# Patient Record
Sex: MALE | Race: WHITE | NOT HISPANIC OR LATINO
[De-identification: names, ages, dates, MRNs, and addresses within clinical notes are randomized per-mention and may not be internally consistent; named-entity substitution may affect disease eponyms.]

---

## 2017-03-13 ENCOUNTER — APPOINTMENT (OUTPATIENT)
Dept: SPINE | Facility: CLINIC | Age: 72
End: 2017-03-13

## 2017-03-13 VITALS
HEART RATE: 81 BPM | BODY MASS INDEX: 29.55 KG/M2 | DIASTOLIC BLOOD PRESSURE: 80 MMHG | WEIGHT: 195 LBS | SYSTOLIC BLOOD PRESSURE: 132 MMHG | HEIGHT: 68 IN

## 2017-03-13 RX ORDER — DICLOFENAC SODIUM 10 MG/G
1 GEL TOPICAL
Refills: 0 | Status: ACTIVE | COMMUNITY

## 2017-03-17 ENCOUNTER — OUTPATIENT (OUTPATIENT)
Dept: OUTPATIENT SERVICES | Facility: HOSPITAL | Age: 72
LOS: 1 days | End: 2017-03-17
Payer: MEDICARE

## 2017-03-17 ENCOUNTER — APPOINTMENT (OUTPATIENT)
Dept: RADIOLOGY | Facility: CLINIC | Age: 72
End: 2017-03-17

## 2017-03-17 DIAGNOSIS — M48.06 SPINAL STENOSIS, LUMBAR REGION: ICD-10-CM

## 2017-03-17 PROCEDURE — 72110 X-RAY EXAM L-2 SPINE 4/>VWS: CPT

## 2017-03-17 PROCEDURE — 72082 X-RAY EXAM ENTIRE SPI 2/3 VW: CPT

## 2017-03-23 ENCOUNTER — APPOINTMENT (OUTPATIENT)
Dept: SPINE | Facility: CLINIC | Age: 72
End: 2017-03-23

## 2017-03-23 VITALS
WEIGHT: 195 LBS | BODY MASS INDEX: 29.55 KG/M2 | SYSTOLIC BLOOD PRESSURE: 130 MMHG | HEIGHT: 68 IN | DIASTOLIC BLOOD PRESSURE: 84 MMHG

## 2017-03-27 ENCOUNTER — APPOINTMENT (OUTPATIENT)
Dept: SPINE | Facility: CLINIC | Age: 72
End: 2017-03-27

## 2017-03-30 ENCOUNTER — OUTPATIENT (OUTPATIENT)
Dept: OUTPATIENT SERVICES | Facility: HOSPITAL | Age: 72
LOS: 1 days | End: 2017-03-30
Payer: MEDICARE

## 2017-03-30 ENCOUNTER — APPOINTMENT (OUTPATIENT)
Dept: CT IMAGING | Facility: IMAGING CENTER | Age: 72
End: 2017-03-30

## 2017-03-30 DIAGNOSIS — M48.06 SPINAL STENOSIS, LUMBAR REGION: ICD-10-CM

## 2017-03-30 PROCEDURE — 72131 CT LUMBAR SPINE W/O DYE: CPT

## 2017-04-13 ENCOUNTER — APPOINTMENT (OUTPATIENT)
Dept: SPINE | Facility: CLINIC | Age: 72
End: 2017-04-13

## 2017-04-13 VITALS
HEIGHT: 68 IN | DIASTOLIC BLOOD PRESSURE: 78 MMHG | BODY MASS INDEX: 29.55 KG/M2 | WEIGHT: 195 LBS | SYSTOLIC BLOOD PRESSURE: 124 MMHG

## 2017-04-13 DIAGNOSIS — F40.240 CLAUSTROPHOBIA: ICD-10-CM

## 2017-04-17 ENCOUNTER — APPOINTMENT (OUTPATIENT)
Dept: MRI IMAGING | Facility: IMAGING CENTER | Age: 72
End: 2017-04-17

## 2017-04-17 ENCOUNTER — OUTPATIENT (OUTPATIENT)
Dept: OUTPATIENT SERVICES | Facility: HOSPITAL | Age: 72
LOS: 1 days | End: 2017-04-17
Payer: MEDICARE

## 2017-04-17 DIAGNOSIS — G95.9 DISEASE OF SPINAL CORD, UNSPECIFIED: ICD-10-CM

## 2017-04-17 DIAGNOSIS — M50.90 CERVICAL DISC DISORDER, UNSPECIFIED, UNSPECIFIED CERVICAL REGION: ICD-10-CM

## 2017-04-17 PROCEDURE — 72141 MRI NECK SPINE W/O DYE: CPT

## 2017-04-20 ENCOUNTER — APPOINTMENT (OUTPATIENT)
Dept: SPINE | Facility: CLINIC | Age: 72
End: 2017-04-20

## 2017-04-20 VITALS
HEIGHT: 68 IN | SYSTOLIC BLOOD PRESSURE: 124 MMHG | WEIGHT: 195 LBS | BODY MASS INDEX: 29.55 KG/M2 | DIASTOLIC BLOOD PRESSURE: 82 MMHG

## 2017-04-20 DIAGNOSIS — M50.90 CERVICAL DISC DISORDER, UNSPECIFIED, UNSPECIFIED CERVICAL REGION: ICD-10-CM

## 2017-04-20 DIAGNOSIS — G95.9 DISEASE OF SPINAL CORD, UNSPECIFIED: ICD-10-CM

## 2017-06-15 ENCOUNTER — APPOINTMENT (OUTPATIENT)
Dept: SPINE | Facility: CLINIC | Age: 72
End: 2017-06-15

## 2017-06-15 VITALS
SYSTOLIC BLOOD PRESSURE: 135 MMHG | DIASTOLIC BLOOD PRESSURE: 85 MMHG | BODY MASS INDEX: 29.55 KG/M2 | HEIGHT: 68 IN | WEIGHT: 195 LBS

## 2017-06-16 ENCOUNTER — APPOINTMENT (OUTPATIENT)
Dept: MRI IMAGING | Facility: CLINIC | Age: 72
End: 2017-06-16

## 2017-06-16 ENCOUNTER — OUTPATIENT (OUTPATIENT)
Dept: OUTPATIENT SERVICES | Facility: HOSPITAL | Age: 72
LOS: 1 days | End: 2017-06-16
Payer: MEDICARE

## 2017-06-16 DIAGNOSIS — M48.06 SPINAL STENOSIS, LUMBAR REGION: ICD-10-CM

## 2017-06-16 PROCEDURE — 72146 MRI CHEST SPINE W/O DYE: CPT

## 2017-07-10 ENCOUNTER — OUTPATIENT (OUTPATIENT)
Dept: OUTPATIENT SERVICES | Facility: HOSPITAL | Age: 72
LOS: 1 days | End: 2017-07-10
Payer: MEDICARE

## 2017-07-10 VITALS
HEIGHT: 68 IN | WEIGHT: 197.98 LBS | TEMPERATURE: 98 F | SYSTOLIC BLOOD PRESSURE: 113 MMHG | RESPIRATION RATE: 18 BRPM | DIASTOLIC BLOOD PRESSURE: 79 MMHG | HEART RATE: 77 BPM | OXYGEN SATURATION: 97 %

## 2017-07-10 DIAGNOSIS — M54.9 DORSALGIA, UNSPECIFIED: ICD-10-CM

## 2017-07-10 DIAGNOSIS — Z98.890 OTHER SPECIFIED POSTPROCEDURAL STATES: Chronic | ICD-10-CM

## 2017-07-10 DIAGNOSIS — M48.06 SPINAL STENOSIS, LUMBAR REGION: ICD-10-CM

## 2017-07-10 DIAGNOSIS — Z01.818 ENCOUNTER FOR OTHER PREPROCEDURAL EXAMINATION: ICD-10-CM

## 2017-07-10 DIAGNOSIS — I10 ESSENTIAL (PRIMARY) HYPERTENSION: ICD-10-CM

## 2017-07-10 LAB
ANION GAP SERPL CALC-SCNC: 16 MMOL/L — SIGNIFICANT CHANGE UP (ref 5–17)
BUN SERPL-MCNC: 24 MG/DL — HIGH (ref 7–23)
CALCIUM SERPL-MCNC: 9.8 MG/DL — SIGNIFICANT CHANGE UP (ref 8.4–10.5)
CHLORIDE SERPL-SCNC: 103 MMOL/L — SIGNIFICANT CHANGE UP (ref 96–108)
CO2 SERPL-SCNC: 24 MMOL/L — SIGNIFICANT CHANGE UP (ref 22–31)
CREAT SERPL-MCNC: 1.43 MG/DL — HIGH (ref 0.5–1.3)
GLUCOSE SERPL-MCNC: 114 MG/DL — HIGH (ref 70–99)
HCT VFR BLD CALC: 45.3 % — SIGNIFICANT CHANGE UP (ref 39–50)
HGB BLD-MCNC: 15.8 G/DL — SIGNIFICANT CHANGE UP (ref 13–17)
MCHC RBC-ENTMCNC: 30.4 PG — SIGNIFICANT CHANGE UP (ref 27–34)
MCHC RBC-ENTMCNC: 34.9 GM/DL — SIGNIFICANT CHANGE UP (ref 32–36)
MCV RBC AUTO: 87.3 FL — SIGNIFICANT CHANGE UP (ref 80–100)
PLATELET # BLD AUTO: 208 K/UL — SIGNIFICANT CHANGE UP (ref 150–400)
POTASSIUM SERPL-MCNC: 3.7 MMOL/L — SIGNIFICANT CHANGE UP (ref 3.5–5.3)
POTASSIUM SERPL-SCNC: 3.7 MMOL/L — SIGNIFICANT CHANGE UP (ref 3.5–5.3)
RBC # BLD: 5.19 M/UL — SIGNIFICANT CHANGE UP (ref 4.2–5.8)
RBC # FLD: 13.1 % — SIGNIFICANT CHANGE UP (ref 10.3–14.5)
SODIUM SERPL-SCNC: 143 MMOL/L — SIGNIFICANT CHANGE UP (ref 135–145)
WBC # BLD: 6.01 K/UL — SIGNIFICANT CHANGE UP (ref 3.8–10.5)
WBC # FLD AUTO: 6.01 K/UL — SIGNIFICANT CHANGE UP (ref 3.8–10.5)

## 2017-07-10 PROCEDURE — 85027 COMPLETE CBC AUTOMATED: CPT

## 2017-07-10 PROCEDURE — G0463: CPT

## 2017-07-10 PROCEDURE — 80048 BASIC METABOLIC PNL TOTAL CA: CPT

## 2017-07-10 RX ORDER — LIDOCAINE HCL 20 MG/ML
0.2 VIAL (ML) INJECTION ONCE
Qty: 0 | Refills: 0 | Status: DISCONTINUED | OUTPATIENT
Start: 2017-07-21 | End: 2017-07-21

## 2017-07-10 RX ORDER — CEFAZOLIN SODIUM 1 G
2000 VIAL (EA) INJECTION ONCE
Qty: 0 | Refills: 0 | Status: DISCONTINUED | OUTPATIENT
Start: 2017-07-21 | End: 2017-07-21

## 2017-07-10 RX ORDER — SODIUM CHLORIDE 9 MG/ML
3 INJECTION INTRAMUSCULAR; INTRAVENOUS; SUBCUTANEOUS EVERY 8 HOURS
Qty: 0 | Refills: 0 | Status: DISCONTINUED | OUTPATIENT
Start: 2017-07-21 | End: 2017-07-21

## 2017-07-10 NOTE — H&P PST ADULT - PSH
History of AAA (Abdominal Aortic Aneurysm) Repair  2007  Left Cataract    S/P Partial Lobectomy of Lung  scar tissue  Torn Achilles Tendon  right History of AAA (Abdominal Aortic Aneurysm) Repair  2007  History of hydrocelectomy    Left Cataract    S/P Partial Lobectomy of Lung  scar tissue  Torn Achilles Tendon  right

## 2017-07-10 NOTE — H&P PST ADULT - HISTORY OF PRESENT ILLNESS
72 year old male presents for placement of permanent spinal cord stimulator. Pt. reports experiencing chronic lumbar sacral back pain X 20+ years. s/p spinal cord stimulator trial with relief from pain.

## 2017-07-10 NOTE — H&P PST ADULT - PMH
Back Pain    Chronic back pain    Folliculitis    History of DVT (deep vein thrombosis)  s/p AAA repair  HTN (Hypertension)    Hyperlipidemia    Lumbar disc disease    Spinal stenosis

## 2017-07-21 ENCOUNTER — TRANSCRIPTION ENCOUNTER (OUTPATIENT)
Age: 72
End: 2017-07-21

## 2017-07-21 ENCOUNTER — APPOINTMENT (OUTPATIENT)
Dept: SPINE | Facility: HOSPITAL | Age: 72
End: 2017-07-21

## 2017-07-21 ENCOUNTER — OUTPATIENT (OUTPATIENT)
Dept: INPATIENT UNIT | Facility: HOSPITAL | Age: 72
LOS: 1 days | Discharge: HOME | End: 2017-07-21
Payer: MEDICARE

## 2017-07-21 VITALS
HEART RATE: 78 BPM | DIASTOLIC BLOOD PRESSURE: 69 MMHG | HEIGHT: 68 IN | WEIGHT: 197.98 LBS | TEMPERATURE: 98 F | OXYGEN SATURATION: 96 % | RESPIRATION RATE: 18 BRPM | SYSTOLIC BLOOD PRESSURE: 104 MMHG

## 2017-07-21 VITALS
HEART RATE: 70 BPM | OXYGEN SATURATION: 99 % | DIASTOLIC BLOOD PRESSURE: 67 MMHG | SYSTOLIC BLOOD PRESSURE: 123 MMHG | RESPIRATION RATE: 20 BRPM | TEMPERATURE: 98 F

## 2017-07-21 DIAGNOSIS — M48.06 SPINAL STENOSIS, LUMBAR REGION: ICD-10-CM

## 2017-07-21 DIAGNOSIS — Z98.890 OTHER SPECIFIED POSTPROCEDURAL STATES: Chronic | ICD-10-CM

## 2017-07-21 PROCEDURE — C1778: CPT

## 2017-07-21 PROCEDURE — 63655 IMPLANT NEUROELECTRODES: CPT

## 2017-07-21 PROCEDURE — C1787: CPT

## 2017-07-21 PROCEDURE — 63685 INS/RPLC SPI NPG/RCVR POCKET: CPT

## 2017-07-21 PROCEDURE — 76000 FLUOROSCOPY <1 HR PHYS/QHP: CPT

## 2017-07-21 PROCEDURE — C1713: CPT

## 2017-07-21 PROCEDURE — C1820: CPT

## 2017-07-21 PROCEDURE — C1889: CPT

## 2017-07-21 RX ORDER — OXYCODONE HYDROCHLORIDE 5 MG/1
5 TABLET ORAL ONCE
Qty: 0 | Refills: 0 | Status: DISCONTINUED | OUTPATIENT
Start: 2017-07-21 | End: 2017-07-21

## 2017-07-21 RX ORDER — CLINDAMYCIN PHOSPHATE GEL USP, 1% 10 MG/G
0 GEL TOPICAL
Qty: 0 | Refills: 0 | COMMUNITY

## 2017-07-21 RX ORDER — OXYCODONE AND ACETAMINOPHEN 5; 325 MG/1; MG/1
1 TABLET ORAL EVERY 4 HOURS
Qty: 0 | Refills: 0 | Status: DISCONTINUED | OUTPATIENT
Start: 2017-07-21 | End: 2017-07-21

## 2017-07-21 RX ORDER — ONDANSETRON 8 MG/1
4 TABLET, FILM COATED ORAL ONCE
Qty: 0 | Refills: 0 | Status: DISCONTINUED | OUTPATIENT
Start: 2017-07-21 | End: 2017-07-21

## 2017-07-21 RX ORDER — DEXTROSE MONOHYDRATE, SODIUM CHLORIDE, AND POTASSIUM CHLORIDE 50; .745; 4.5 G/1000ML; G/1000ML; G/1000ML
1000 INJECTION, SOLUTION INTRAVENOUS
Qty: 0 | Refills: 0 | Status: DISCONTINUED | OUTPATIENT
Start: 2017-07-21 | End: 2017-07-21

## 2017-07-21 RX ORDER — ASPIRIN/CALCIUM CARB/MAGNESIUM 324 MG
1 TABLET ORAL
Qty: 0 | Refills: 0 | COMMUNITY

## 2017-07-21 RX ORDER — FENTANYL CITRATE 50 UG/ML
50 INJECTION INTRAVENOUS
Qty: 0 | Refills: 0 | Status: DISCONTINUED | OUTPATIENT
Start: 2017-07-21 | End: 2017-07-21

## 2017-07-21 RX ORDER — CEPHALEXIN 500 MG
1 CAPSULE ORAL
Qty: 15 | Refills: 0
Start: 2017-07-21 | End: 2017-07-26

## 2017-07-21 RX ORDER — SODIUM CHLORIDE 9 MG/ML
1000 INJECTION, SOLUTION INTRAVENOUS
Qty: 0 | Refills: 0 | Status: DISCONTINUED | OUTPATIENT
Start: 2017-07-21 | End: 2017-07-21

## 2017-07-21 RX ADMIN — SODIUM CHLORIDE 3 MILLILITER(S): 9 INJECTION INTRAMUSCULAR; INTRAVENOUS; SUBCUTANEOUS at 09:30

## 2017-07-21 RX ADMIN — OXYCODONE HYDROCHLORIDE 5 MILLIGRAM(S): 5 TABLET ORAL at 15:32

## 2017-07-21 NOTE — ASU DISCHARGE PLAN (ADULT/PEDIATRIC). - FOLLOWUP APPOINTMENT CLINIC/PHYSICIAN
call Dr. Silva' office for follow up appointment. call Dr. Silva' office for follow up appointment. [phone] or [phone]

## 2017-07-21 NOTE — ASU DISCHARGE PLAN (ADULT/PEDIATRIC). - MEDICATION SUMMARY - MEDICATIONS TO TAKE
I will START or STAY ON the medications listed below when I get home from the hospital:    oxycodone-acetaminophen 5mg-325mg oral tablet  -- 1-2 tab(s) by mouth every 4- 6 hours, As Needed -for severe pain MDD:10 tabs  -- Indication: For pain    simvastatin 20 mg oral tablet  -- 1 tab(s) by mouth once a day (at bedtime)  -- Indication: For Homemed    felodipine 10 mg oral tablet, extended release  -- 2 tab(s) by mouth once a day  -- Indication: For Homemed    cephalexin 500 mg oral capsule  -- 1 cap(s) by mouth 3 times a day for 5 days. MDD:3 caps  -- Finish all this medication unless otherwise directed by prescriber.    -- Indication: For antibiotics    tiZANidine 6 mg oral capsule  -- 1 cap(s) by mouth once a day, As Needed  -- Indication: For Homemed    Coenzyme Q10 10 mg oral capsule  -- 1 tab(s) by mouth once a day  -- Indication: For Homemed    Vitamin D3  -- 1 tab(s) by mouth once a day  -- Indication: For Homemed

## 2017-07-21 NOTE — PATIENT PROFILE ADULT. - PSH
History of AAA (Abdominal Aortic Aneurysm) Repair  2007  History of hydrocelectomy    Left Cataract    S/P Partial Lobectomy of Lung  scar tissue  Torn Achilles Tendon  right

## 2017-07-21 NOTE — ASU DISCHARGE PLAN (ADULT/PEDIATRIC). - NOTIFY
Swelling that continues/Numbness, tingling/Bleeding that does not stop/Pain not relieved by Medications/Numbness, color, or temperature change to extremity/Unable to Urinate

## 2017-07-21 NOTE — ASU DISCHARGE PLAN (ADULT/PEDIATRIC). - MEDICATION SUMMARY - MEDICATIONS TO STOP TAKING
I will STOP taking the medications listed below when I get home from the hospital:    aspirin 81 mg oral delayed release tablet  -- 1 tab(s) by mouth once a day, last dose 7/6/17 as per Dr. Ocampo instruction

## 2017-07-23 ENCOUNTER — EMERGENCY (EMERGENCY)
Facility: HOSPITAL | Age: 72
LOS: 1 days | Discharge: HOME | End: 2017-07-23
Attending: EMERGENCY MEDICINE | Admitting: EMERGENCY MEDICINE
Payer: MEDICARE

## 2017-07-23 VITALS
RESPIRATION RATE: 19 BRPM | DIASTOLIC BLOOD PRESSURE: 84 MMHG | OXYGEN SATURATION: 96 % | SYSTOLIC BLOOD PRESSURE: 138 MMHG | TEMPERATURE: 98 F | HEIGHT: 68 IN | HEART RATE: 97 BPM

## 2017-07-23 VITALS
SYSTOLIC BLOOD PRESSURE: 133 MMHG | RESPIRATION RATE: 18 BRPM | OXYGEN SATURATION: 97 % | TEMPERATURE: 98 F | HEART RATE: 82 BPM | DIASTOLIC BLOOD PRESSURE: 84 MMHG

## 2017-07-23 DIAGNOSIS — M54.9 DORSALGIA, UNSPECIFIED: ICD-10-CM

## 2017-07-23 DIAGNOSIS — Z98.890 OTHER SPECIFIED POSTPROCEDURAL STATES: Chronic | ICD-10-CM

## 2017-07-23 LAB
ALBUMIN SERPL ELPH-MCNC: 4 G/DL — SIGNIFICANT CHANGE UP (ref 3.3–5)
ALP SERPL-CCNC: 59 U/L — SIGNIFICANT CHANGE UP (ref 40–120)
ALT FLD-CCNC: 16 U/L RC — SIGNIFICANT CHANGE UP (ref 10–45)
ANION GAP SERPL CALC-SCNC: 18 MMOL/L — HIGH (ref 5–17)
AST SERPL-CCNC: 21 U/L — SIGNIFICANT CHANGE UP (ref 10–40)
BASE EXCESS BLDV CALC-SCNC: 1.9 MMOL/L — SIGNIFICANT CHANGE UP (ref -2–2)
BASOPHILS # BLD AUTO: 0 K/UL — SIGNIFICANT CHANGE UP (ref 0–0.2)
BASOPHILS NFR BLD AUTO: 0.3 % — SIGNIFICANT CHANGE UP (ref 0–2)
BILIRUB SERPL-MCNC: 0.6 MG/DL — SIGNIFICANT CHANGE UP (ref 0.2–1.2)
BUN SERPL-MCNC: 26 MG/DL — HIGH (ref 7–23)
CA-I SERPL-SCNC: 1.17 MMOL/L — SIGNIFICANT CHANGE UP (ref 1.12–1.3)
CALCIUM SERPL-MCNC: 8.8 MG/DL — SIGNIFICANT CHANGE UP (ref 8.4–10.5)
CHLORIDE BLDV-SCNC: 103 MMOL/L — SIGNIFICANT CHANGE UP (ref 96–108)
CHLORIDE SERPL-SCNC: 102 MMOL/L — SIGNIFICANT CHANGE UP (ref 96–108)
CO2 BLDV-SCNC: 28 MMOL/L — SIGNIFICANT CHANGE UP (ref 22–30)
CO2 SERPL-SCNC: 22 MMOL/L — SIGNIFICANT CHANGE UP (ref 22–31)
CREAT SERPL-MCNC: 1.54 MG/DL — HIGH (ref 0.5–1.3)
CRP SERPL-MCNC: 11.3 MG/DL — HIGH (ref 0–0.4)
EOSINOPHIL # BLD AUTO: 0.2 K/UL — SIGNIFICANT CHANGE UP (ref 0–0.5)
EOSINOPHIL NFR BLD AUTO: 2.2 % — SIGNIFICANT CHANGE UP (ref 0–6)
ERYTHROCYTE [SEDIMENTATION RATE] IN BLOOD: 69 MM/HR — HIGH (ref 0–20)
GAS PNL BLDV: 138 MMOL/L — SIGNIFICANT CHANGE UP (ref 136–145)
GAS PNL BLDV: SIGNIFICANT CHANGE UP
GLUCOSE BLDV-MCNC: 133 MG/DL — HIGH (ref 70–99)
GLUCOSE SERPL-MCNC: 132 MG/DL — HIGH (ref 70–99)
HCO3 BLDV-SCNC: 26 MMOL/L — SIGNIFICANT CHANGE UP (ref 21–29)
HCT VFR BLD CALC: 43.8 % — SIGNIFICANT CHANGE UP (ref 39–50)
HCT VFR BLDA CALC: 45 % — SIGNIFICANT CHANGE UP (ref 39–50)
HGB BLD CALC-MCNC: 14.8 G/DL — SIGNIFICANT CHANGE UP (ref 13–17)
HGB BLD-MCNC: 15.2 G/DL — SIGNIFICANT CHANGE UP (ref 13–17)
LACTATE BLDV-MCNC: 1.6 MMOL/L — SIGNIFICANT CHANGE UP (ref 0.7–2)
LYMPHOCYTES # BLD AUTO: 1.2 K/UL — SIGNIFICANT CHANGE UP (ref 1–3.3)
LYMPHOCYTES # BLD AUTO: 15.2 % — SIGNIFICANT CHANGE UP (ref 13–44)
MCHC RBC-ENTMCNC: 31.6 PG — SIGNIFICANT CHANGE UP (ref 27–34)
MCHC RBC-ENTMCNC: 34.6 GM/DL — SIGNIFICANT CHANGE UP (ref 32–36)
MCV RBC AUTO: 91.3 FL — SIGNIFICANT CHANGE UP (ref 80–100)
MONOCYTES # BLD AUTO: 0.5 K/UL — SIGNIFICANT CHANGE UP (ref 0–0.9)
MONOCYTES NFR BLD AUTO: 6 % — SIGNIFICANT CHANGE UP (ref 2–14)
NEUTROPHILS # BLD AUTO: 6.2 K/UL — SIGNIFICANT CHANGE UP (ref 1.8–7.4)
NEUTROPHILS NFR BLD AUTO: 76.3 % — SIGNIFICANT CHANGE UP (ref 43–77)
OTHER CELLS CSF MANUAL: 17 ML/DL — LOW (ref 18–22)
PCO2 BLDV: 43 MMHG — SIGNIFICANT CHANGE UP (ref 35–50)
PH BLDV: 7.4 — SIGNIFICANT CHANGE UP (ref 7.35–7.45)
PLATELET # BLD AUTO: 193 K/UL — SIGNIFICANT CHANGE UP (ref 150–400)
PO2 BLDV: 51 MMHG — HIGH (ref 25–45)
POTASSIUM BLDV-SCNC: 3.4 MMOL/L — LOW (ref 3.5–5)
POTASSIUM SERPL-MCNC: 3.8 MMOL/L — SIGNIFICANT CHANGE UP (ref 3.5–5.3)
POTASSIUM SERPL-SCNC: 3.8 MMOL/L — SIGNIFICANT CHANGE UP (ref 3.5–5.3)
PROCALCITONIN SERPL-MCNC: <0.05 NG/ML — HIGH (ref 0–0.04)
PROT SERPL-MCNC: 7.6 G/DL — SIGNIFICANT CHANGE UP (ref 6–8.3)
RBC # BLD: 4.8 M/UL — SIGNIFICANT CHANGE UP (ref 4.2–5.8)
RBC # FLD: 12 % — SIGNIFICANT CHANGE UP (ref 10.3–14.5)
SAO2 % BLDV: 85 % — SIGNIFICANT CHANGE UP (ref 67–88)
SODIUM SERPL-SCNC: 142 MMOL/L — SIGNIFICANT CHANGE UP (ref 135–145)
WBC # BLD: 8.1 K/UL — SIGNIFICANT CHANGE UP (ref 3.8–10.5)
WBC # FLD AUTO: 8.1 K/UL — SIGNIFICANT CHANGE UP (ref 3.8–10.5)

## 2017-07-23 PROCEDURE — 82435 ASSAY OF BLOOD CHLORIDE: CPT

## 2017-07-23 PROCEDURE — 85027 COMPLETE CBC AUTOMATED: CPT

## 2017-07-23 PROCEDURE — 99284 EMERGENCY DEPT VISIT MOD MDM: CPT | Mod: 25

## 2017-07-23 PROCEDURE — 84145 PROCALCITONIN (PCT): CPT

## 2017-07-23 PROCEDURE — 85014 HEMATOCRIT: CPT

## 2017-07-23 PROCEDURE — 84132 ASSAY OF SERUM POTASSIUM: CPT

## 2017-07-23 PROCEDURE — 99283 EMERGENCY DEPT VISIT LOW MDM: CPT | Mod: GC

## 2017-07-23 PROCEDURE — 82947 ASSAY GLUCOSE BLOOD QUANT: CPT

## 2017-07-23 PROCEDURE — 80053 COMPREHEN METABOLIC PANEL: CPT

## 2017-07-23 PROCEDURE — 82330 ASSAY OF CALCIUM: CPT

## 2017-07-23 PROCEDURE — 85652 RBC SED RATE AUTOMATED: CPT

## 2017-07-23 PROCEDURE — 86140 C-REACTIVE PROTEIN: CPT

## 2017-07-23 PROCEDURE — 83605 ASSAY OF LACTIC ACID: CPT

## 2017-07-23 PROCEDURE — 82803 BLOOD GASES ANY COMBINATION: CPT

## 2017-07-23 PROCEDURE — 84295 ASSAY OF SERUM SODIUM: CPT

## 2017-07-23 NOTE — CONSULT NOTE ADULT - SUBJECTIVE AND OBJECTIVE BOX
HPI: Patient is a 63yo M w/ hx of chronic back pain, POD #2 s/p placement of spinal cord stimulator and battery, p/w redness around battery incision site w/ tenderness and warmth. Patient denies any fevers, chills, night sweats, nausea/vomiting, and new neurologic deficits, and has not had any drainage from the wound. WBC in ED is WNL at 8.1 and incision looks well healed w/ mild erythema and induration. Patient is neurologically intact on exam.      PAST MEDICAL HISTORY   History of DVT (deep vein thrombosis)  Folliculitis  Chronic back pain  Spinal stenosis  Lumbar disc disease  Hyperlipidemia  Back Pain  HTN (Hypertension)    PAST SURGICAL HISTORY   History of hydrocelectomy  S/P Partial Lobectomy of Lung  Torn Achilles Tendon  History of AAA (Abdominal Aortic Aneurysm) Repair  Left Cataract          SOCIAL HISTORY:   Occupation:   Marital Status:     FAMILY HISTORY:  No pertinent family history in first degree relatives      PHYSICAL EXAM:    Constitutional: No Acute Distress     Neurological: AOx3, Following Commands, Moving all Extremities     Motor exam:          Upper extremity                         Delt     Bicep     Tricep    HG                                                 R         5/5        5/5        5/5       5/5                                               L          5/5        5/5        5/5       5/5          Lower extremity                        HF         KF        KE       DF         PF                                                  R        5/5        5/5        5/5       5/5         5/5                                               L         5/5        5/5       5/5       5/5          5/5                                                 Sensation: x intact to light touch  [] decreased:     Pulmonary: Clear to Auscultation, No rales, No rhonchi, No wheezes     Cardiovascular: S1, S2, Regular rate and rhythm     Gastrointestinal: Soft, Non-tender, Non-distended     Extremities: No calf tenderness     Incision: well healing, c/d/i; mild area of erythema w/ induration surrounding incision; tender to palpation; no drainage or dehiscece    LABS:                        15.2   8.1   )-----------( 193      ( 23 Jul 2017 17:17 )             43.8

## 2017-07-23 NOTE — ED PROVIDER NOTE - SKIN, MLM
Skin normal color for race, warm, dry and intact. No evidence of rash. Around Surgical site right para lumbar area erythema and induration without fluctuation no d/c from surgical site that is intact no bleeding  --Flores

## 2017-07-23 NOTE — CONSULT NOTE ADULT - PROBLEM SELECTOR RECOMMENDATION 9
no acute neurosurgical intervention  pain control  continue home ruthfflex  patient can f/u w/ Dr. Silva this week in the office

## 2017-07-23 NOTE — ED PROVIDER NOTE - ATTENDING CONTRIBUTION TO CARE
I have seen and evaluated this patient with the resident.   I agree with the findings  unless other wise stated.  I have made appropriate changes in documentations where needed, After my face to face bedside evaluation, I am further  noting: Pt had recent surgery in back for nerve stimulator implantation has surrounding erythema and induration no d/c no fever no leucocytosis no signs of abscess on oral keflex evaluated by NSX resident who d/w dr Silva D/C home f/u with Dr Silva in 2 days  - Flores

## 2017-07-23 NOTE — ED ADULT NURSE NOTE - OBJECTIVE STATEMENT
72 year old male, past medical history of chronic back pain stimulator placed 3 days ago by Dr. Silva presents with "infection to stimulator site." Patient states that he was discharged from hospital on Friday (two days ago), today noticed erythema surrounding site as well as pain when laying on back. Patient went to Urgent Care and was referred to ED. No chest pain, SOB, n/v/d, or fever. Surgical site located right lower back, erythema surrounding site. Dried bloody discharge. Patient able to ambulate.

## 2017-07-23 NOTE — ED PROVIDER NOTE - MEDICAL DECISION MAKING DETAILS
Pt had recent surgery in back for nerve stimulator implantation has surrounding erythema and induration no d/c no fever no leucocytosis no signs of abscess on oral keflex evaluated by NSX resident who d/w dr Silva D/C home f/u with Dr Silva in 2 days  - Flores

## 2017-07-23 NOTE — CONSULT NOTE ADULT - ASSESSMENT
This is a 63 yo M POD#2 s/p placement of SCS and battery who p/w redness and induration at the battery incision site. Considering he is only two days out from surgery, and his WBC is normal, an operative site infection is highly unlikely; the symptoms are consistent w/ an inflammatory reaction that can be followed as an outpatient.

## 2017-07-23 NOTE — ED PROVIDER NOTE - OBJECTIVE STATEMENT
72 year old male, past medical history of chronic back pain stimulator placed 3 days ago by Dr. Silva presents for back pain. Patient reports wound site appears infected as it appears red and is very tender. No fevers, chills, nauseas, vomiting. No chest pain, shortness of breath, abdominal pain, diarrhea, constipation, cough, dysuria.

## 2017-07-24 ENCOUNTER — APPOINTMENT (OUTPATIENT)
Dept: SPINE | Facility: CLINIC | Age: 72
End: 2017-07-24

## 2017-07-24 VITALS
HEIGHT: 68 IN | WEIGHT: 200 LBS | DIASTOLIC BLOOD PRESSURE: 83 MMHG | HEART RATE: 80 BPM | BODY MASS INDEX: 30.31 KG/M2 | SYSTOLIC BLOOD PRESSURE: 144 MMHG

## 2017-07-24 RX ORDER — DIAZEPAM 5 MG/1
5 TABLET ORAL
Qty: 2 | Refills: 0 | Status: COMPLETED | COMMUNITY
Start: 2017-04-13 | End: 2017-07-24

## 2017-07-24 RX ORDER — DIAZEPAM 5 MG/1
5 TABLET ORAL ONCE
Qty: 2 | Refills: 0 | Status: COMPLETED | COMMUNITY
Start: 2017-06-15 | End: 2017-07-24

## 2017-07-24 RX ORDER — TIZANIDINE HYDROCHLORIDE 6 MG/1
6 CAPSULE ORAL
Qty: 30 | Refills: 0 | Status: COMPLETED | COMMUNITY
Start: 2017-03-24 | End: 2017-07-24

## 2017-07-24 RX ORDER — SIMVASTATIN 20 MG/1
20 TABLET, FILM COATED ORAL
Refills: 0 | Status: COMPLETED | COMMUNITY
End: 2017-07-24

## 2017-07-24 RX ORDER — CHROMIUM 200 MCG
TABLET ORAL
Refills: 0 | Status: ACTIVE | COMMUNITY

## 2017-07-24 NOTE — ED POST DISCHARGE NOTE - OTHER COMMUNICATION
spoke with patients wife who advises he saw Dr. Silva today who feels this is inflammatory and not infectious, no fever, was advised he should be taking home temps. feeling a lot better. discussed return precations

## 2017-07-31 ENCOUNTER — APPOINTMENT (OUTPATIENT)
Dept: SPINE | Facility: CLINIC | Age: 72
End: 2017-07-31
Payer: MEDICARE

## 2017-07-31 VITALS
HEIGHT: 68 IN | BODY MASS INDEX: 30.16 KG/M2 | SYSTOLIC BLOOD PRESSURE: 126 MMHG | HEART RATE: 73 BPM | WEIGHT: 199 LBS | DIASTOLIC BLOOD PRESSURE: 82 MMHG

## 2017-07-31 PROCEDURE — 99024 POSTOP FOLLOW-UP VISIT: CPT

## 2017-08-02 ENCOUNTER — APPOINTMENT (OUTPATIENT)
Dept: VASCULAR SURGERY | Facility: CLINIC | Age: 72
End: 2017-08-02
Payer: MEDICARE

## 2017-08-02 VITALS
WEIGHT: 192 LBS | DIASTOLIC BLOOD PRESSURE: 79 MMHG | TEMPERATURE: 98.5 F | SYSTOLIC BLOOD PRESSURE: 114 MMHG | HEART RATE: 73 BPM | BODY MASS INDEX: 29.1 KG/M2 | HEIGHT: 68 IN

## 2017-08-02 PROCEDURE — 93978 VASCULAR STUDY: CPT

## 2017-08-02 PROCEDURE — 99213 OFFICE O/P EST LOW 20 MIN: CPT | Mod: 25

## 2017-08-21 ENCOUNTER — APPOINTMENT (OUTPATIENT)
Dept: SPINE | Facility: CLINIC | Age: 72
End: 2017-08-21
Payer: MEDICARE

## 2017-08-21 VITALS
DIASTOLIC BLOOD PRESSURE: 84 MMHG | BODY MASS INDEX: 29.1 KG/M2 | HEIGHT: 68 IN | HEART RATE: 80 BPM | WEIGHT: 192 LBS | SYSTOLIC BLOOD PRESSURE: 137 MMHG

## 2017-08-21 DIAGNOSIS — M48.06 SPINAL STENOSIS, LUMBAR REGION: ICD-10-CM

## 2017-08-21 PROCEDURE — 99024 POSTOP FOLLOW-UP VISIT: CPT

## 2017-10-22 NOTE — ED POST DISCHARGE NOTE - RESULT SUMMARY
esr, crp elevated
Rest, Advance activity as tolerated.  Continue all previously prescribed medications as directed. Follow up with Dr. Pineda tomorrow [phone].  Follow up with your primary care physician in 48-72 hours- bring copies of your results.  Return to the Emergency Department for fevers, worsening or persistent symptoms OR ANY NEW OR CONCERNING SYMPTOMS.

## 2018-04-16 ENCOUNTER — APPOINTMENT (OUTPATIENT)
Dept: SPINE | Facility: CLINIC | Age: 73
End: 2018-04-16
Payer: MEDICARE

## 2018-04-16 VITALS
WEIGHT: 197 LBS | HEIGHT: 68 IN | DIASTOLIC BLOOD PRESSURE: 70 MMHG | SYSTOLIC BLOOD PRESSURE: 126 MMHG | HEART RATE: 85 BPM | BODY MASS INDEX: 29.86 KG/M2

## 2018-04-16 DIAGNOSIS — Z86.69 PERSONAL HISTORY OF OTHER DISEASES OF THE NERVOUS SYSTEM AND SENSE ORGANS: ICD-10-CM

## 2018-04-16 DIAGNOSIS — G62.9 POLYNEUROPATHY, UNSPECIFIED: ICD-10-CM

## 2018-04-16 PROCEDURE — 99213 OFFICE O/P EST LOW 20 MIN: CPT

## 2018-08-07 ENCOUNTER — APPOINTMENT (OUTPATIENT)
Dept: VASCULAR SURGERY | Facility: CLINIC | Age: 73
End: 2018-08-07
Payer: MEDICARE

## 2018-08-07 VITALS
BODY MASS INDEX: 29.55 KG/M2 | SYSTOLIC BLOOD PRESSURE: 133 MMHG | HEIGHT: 68 IN | HEART RATE: 68 BPM | WEIGHT: 195 LBS | DIASTOLIC BLOOD PRESSURE: 75 MMHG

## 2018-08-07 PROBLEM — M51.9 UNSPECIFIED THORACIC, THORACOLUMBAR AND LUMBOSACRAL INTERVERTEBRAL DISC DISORDER: Chronic | Status: ACTIVE | Noted: 2017-07-10

## 2018-08-07 PROBLEM — L73.9 FOLLICULAR DISORDER, UNSPECIFIED: Chronic | Status: ACTIVE | Noted: 2017-07-10

## 2018-08-07 PROBLEM — M48.00 SPINAL STENOSIS, SITE UNSPECIFIED: Chronic | Status: ACTIVE | Noted: 2017-07-10

## 2018-08-07 PROBLEM — M54.9 DORSALGIA, UNSPECIFIED: Chronic | Status: ACTIVE | Noted: 2017-07-10

## 2018-08-07 PROBLEM — E78.5 HYPERLIPIDEMIA, UNSPECIFIED: Chronic | Status: ACTIVE | Noted: 2017-07-10

## 2018-08-07 PROCEDURE — 93978 VASCULAR STUDY: CPT

## 2018-08-07 PROCEDURE — 99213 OFFICE O/P EST LOW 20 MIN: CPT

## 2018-10-22 ENCOUNTER — EMERGENCY (EMERGENCY)
Facility: HOSPITAL | Age: 73
LOS: 1 days | Discharge: HOME | End: 2018-10-22
Attending: EMERGENCY MEDICINE | Admitting: EMERGENCY MEDICINE
Payer: MEDICARE

## 2018-10-22 VITALS
RESPIRATION RATE: 15 BRPM | SYSTOLIC BLOOD PRESSURE: 154 MMHG | OXYGEN SATURATION: 100 % | TEMPERATURE: 98 F | DIASTOLIC BLOOD PRESSURE: 74 MMHG | HEART RATE: 85 BPM

## 2018-10-22 VITALS
SYSTOLIC BLOOD PRESSURE: 137 MMHG | TEMPERATURE: 97 F | OXYGEN SATURATION: 100 % | RESPIRATION RATE: 16 BRPM | DIASTOLIC BLOOD PRESSURE: 84 MMHG | HEART RATE: 65 BPM

## 2018-10-22 DIAGNOSIS — Z98.890 OTHER SPECIFIED POSTPROCEDURAL STATES: Chronic | ICD-10-CM

## 2018-10-22 LAB
ALBUMIN SERPL ELPH-MCNC: 4.2 G/DL — SIGNIFICANT CHANGE UP (ref 3.3–5)
ALP SERPL-CCNC: 79 U/L — SIGNIFICANT CHANGE UP (ref 40–120)
ALT FLD-CCNC: 16 U/L — SIGNIFICANT CHANGE UP (ref 4–41)
AST SERPL-CCNC: 16 U/L — SIGNIFICANT CHANGE UP (ref 4–40)
BASOPHILS # BLD AUTO: 0.04 K/UL — SIGNIFICANT CHANGE UP (ref 0–0.2)
BASOPHILS NFR BLD AUTO: 0.6 % — SIGNIFICANT CHANGE UP (ref 0–2)
BILIRUB SERPL-MCNC: 0.5 MG/DL — SIGNIFICANT CHANGE UP (ref 0.2–1.2)
BUN SERPL-MCNC: 26 MG/DL — HIGH (ref 7–23)
CALCIUM SERPL-MCNC: 9.3 MG/DL — SIGNIFICANT CHANGE UP (ref 8.4–10.5)
CHLORIDE SERPL-SCNC: 104 MMOL/L — SIGNIFICANT CHANGE UP (ref 98–107)
CO2 SERPL-SCNC: 23 MMOL/L — SIGNIFICANT CHANGE UP (ref 22–31)
CREAT SERPL-MCNC: 1.05 MG/DL — SIGNIFICANT CHANGE UP (ref 0.5–1.3)
EOSINOPHIL # BLD AUTO: 0.05 K/UL — SIGNIFICANT CHANGE UP (ref 0–0.5)
EOSINOPHIL NFR BLD AUTO: 0.7 % — SIGNIFICANT CHANGE UP (ref 0–6)
GLUCOSE SERPL-MCNC: 116 MG/DL — HIGH (ref 70–99)
HCT VFR BLD CALC: 46.3 % — SIGNIFICANT CHANGE UP (ref 39–50)
HGB BLD-MCNC: 15.8 G/DL — SIGNIFICANT CHANGE UP (ref 13–17)
IMM GRANULOCYTES # BLD AUTO: 0.02 # — SIGNIFICANT CHANGE UP
IMM GRANULOCYTES NFR BLD AUTO: 0.3 % — SIGNIFICANT CHANGE UP (ref 0–1.5)
LYMPHOCYTES # BLD AUTO: 1.8 K/UL — SIGNIFICANT CHANGE UP (ref 1–3.3)
LYMPHOCYTES # BLD AUTO: 26.5 % — SIGNIFICANT CHANGE UP (ref 13–44)
MCHC RBC-ENTMCNC: 29.4 PG — SIGNIFICANT CHANGE UP (ref 27–34)
MCHC RBC-ENTMCNC: 34.1 % — SIGNIFICANT CHANGE UP (ref 32–36)
MCV RBC AUTO: 86.2 FL — SIGNIFICANT CHANGE UP (ref 80–100)
MONOCYTES # BLD AUTO: 0.38 K/UL — SIGNIFICANT CHANGE UP (ref 0–0.9)
MONOCYTES NFR BLD AUTO: 5.6 % — SIGNIFICANT CHANGE UP (ref 2–14)
NEUTROPHILS # BLD AUTO: 4.5 K/UL — SIGNIFICANT CHANGE UP (ref 1.8–7.4)
NEUTROPHILS NFR BLD AUTO: 66.3 % — SIGNIFICANT CHANGE UP (ref 43–77)
NRBC # FLD: 0 — SIGNIFICANT CHANGE UP
PLATELET # BLD AUTO: 257 K/UL — SIGNIFICANT CHANGE UP (ref 150–400)
PMV BLD: 9.8 FL — SIGNIFICANT CHANGE UP (ref 7–13)
POTASSIUM SERPL-MCNC: 3.6 MMOL/L — SIGNIFICANT CHANGE UP (ref 3.5–5.3)
POTASSIUM SERPL-SCNC: 3.6 MMOL/L — SIGNIFICANT CHANGE UP (ref 3.5–5.3)
PROT SERPL-MCNC: 8 G/DL — SIGNIFICANT CHANGE UP (ref 6–8.3)
RBC # BLD: 5.37 M/UL — SIGNIFICANT CHANGE UP (ref 4.2–5.8)
RBC # FLD: 12.8 % — SIGNIFICANT CHANGE UP (ref 10.3–14.5)
SODIUM SERPL-SCNC: 142 MMOL/L — SIGNIFICANT CHANGE UP (ref 135–145)
WBC # BLD: 6.79 K/UL — SIGNIFICANT CHANGE UP (ref 3.8–10.5)
WBC # FLD AUTO: 6.79 K/UL — SIGNIFICANT CHANGE UP (ref 3.8–10.5)

## 2018-10-22 PROCEDURE — 99283 EMERGENCY DEPT VISIT LOW MDM: CPT | Mod: GC,25

## 2018-10-22 RX ORDER — AZTREONAM 2 G
1 VIAL (EA) INJECTION
Qty: 14 | Refills: 0
Start: 2018-10-22 | End: 2018-10-28

## 2018-10-22 RX ADMIN — Medication 1 TABLET(S): at 09:20

## 2018-10-22 NOTE — ED PROVIDER NOTE - PLAN OF CARE
Thank you for visiting our Emergency Department, it has been a pleasure taking part in your healthcare.    Your discharge diagnosis is: Cellulitis     A copy of resulted labs, imaging, and findings have been provided to you.   You have had a detailed discussion with your provider regarding your diagnosis, management and discharge plan including, but not limited to: return precautions, follow up visits with existing or new providers, new prescriptions and/or medication changes, wound and/or spint/cast care or other care   aspects specific to your diagnosis and treatment. You have been given the opportunity to have your questions answered. At this time you have been deemed stable and fit for discharge.    Return precautions to the Emergency Department include but are not limited to: unrelenting nausea, vomiting, fever, chills, chest pain, shortness of breath, dizziness, chest or abdominal pain, worsening back pain, syncope, blood in urine or stool, headache that doesn't resolve, numbness or tingling, loss of sensation, loss of motor function, or any other concerning symptoms.

## 2018-10-22 NOTE — ED PROVIDER NOTE - CARE PLAN
Principal Discharge DX:	Cellulitis and abscess of left lower extremity  Assessment and plan of treatment:	Thank you for visiting our Emergency Department, it has been a pleasure taking part in your healthcare.    Your discharge diagnosis is: Cellulitis     A copy of resulted labs, imaging, and findings have been provided to you.   You have had a detailed discussion with your provider regarding your diagnosis, management and discharge plan including, but not limited to: return precautions, follow up visits with existing or new providers, new prescriptions and/or medication changes, wound and/or spint/cast care or other care   aspects specific to your diagnosis and treatment. You have been given the opportunity to have your questions answered. At this time you have been deemed stable and fit for discharge.    Return precautions to the Emergency Department include but are not limited to: unrelenting nausea, vomiting, fever, chills, chest pain, shortness of breath, dizziness, chest or abdominal pain, worsening back pain, syncope, blood in urine or stool, headache that doesn't resolve, numbness or tingling, loss of sensation, loss of motor function, or any other concerning symptoms.

## 2018-10-22 NOTE — ED PROVIDER NOTE - MEDICAL DECISION MAKING DETAILS
73M w. acute bullae and spreading erythema, no fever, trauma. no pain able to ambulate. concern for cellulitis, or insect bite. will get labs and give abx Di att: 72 yo M w blister and surrounding erythema, no fever, trauma. no pain able to ambulate. concern for cellulitis 2/2 insect bite. will get labs and change antibiotics to bactrim as he is allergic to clinda. Pt was given the option of CDU but preferred to be discharged home and chooses to monitor the cellulitis on his own and return if erythema begins to spread beyond the well demarcated region or if fever, chills, rigors ensue.

## 2018-10-22 NOTE — ED ADULT TRIAGE NOTE - CHIEF COMPLAINT QUOTE
pt states he has a "bite" on left lower leg since last wednesday. states he is unsure if it is getting better, reports increasing redness. assessed by pmd and pt started on doxycycline. denies pain, swelling or drainage

## 2018-10-22 NOTE — ED PROVIDER NOTE - PHYSICAL EXAMINATION
skin: 1.5cm bullae with erythematous base of 7cm in the lower left leg, DP/PT intact, not TTP, mild leg pitting edema b/l, lesion is mildly warm compare to the right, normal rom in both legs and knees  General: NAD, good hygiene, well developed  Cardiovascular: RRR, S1&2, no murmurs, rubs, radial pulses equal and b/l  Respiratory: CTABL, no wheezes or crackles, no decreased breath sounds  Abdominal:  soft and non-tender skin: 1.5cm blister with erythematous base of 7cm in the lower left leg, DP/PT intact, not TTP, mild leg pitting edema b/l, lesion is mildly warm compare to the right, normal rom in both legs and knees  General: NAD, good hygiene, well developed  Cardiovascular: RRR, S1&2, no murmurs, rubs, radial pulses equal and b/l  Respiratory: CTABL, no wheezes or crackles, no decreased breath sounds  Abdominal:  soft and non-tender

## 2018-10-22 NOTE — ED PROVIDER NOTE - NS ED ROS FT
General: denies fever, chills, fatigue  HENT: denies nasal congestion, sore throat, ear pain, hearing loss  Eyes: denies visual changes  Neck: denies neck pain, swelling, stiffness  CV: denies chest pain, palpitations  Resp: denies difficulty breathing, cough  GI: denies nausea, vomiting, diarrhea, abdominal pain, constipation  Urinary: denies pain on urination change  MSK: denies joint and muscle pain  Neuro: denies headaches, lightheadedness  Skin: HPI

## 2018-10-22 NOTE — ED PROVIDER NOTE - OBJECTIVE STATEMENT
73M pmh HTN, chronic back pain, c/o acute onset of a bite like swelling of the left shin 5 days ago. Pt has seen a derm and prescribed doxy, but the erythema is spreading. The bullae is not spreading. Pt denied of fever, pain or pruritic, numbness or tingling. No trauma and unsure if it was an insect bite. Pt is able to walk and no decreased range of motion.

## 2018-10-31 ENCOUNTER — APPOINTMENT (OUTPATIENT)
Dept: VASCULAR SURGERY | Facility: CLINIC | Age: 73
End: 2018-10-31
Payer: MEDICARE

## 2018-10-31 VITALS
SYSTOLIC BLOOD PRESSURE: 136 MMHG | TEMPERATURE: 98.2 F | BODY MASS INDEX: 29.55 KG/M2 | DIASTOLIC BLOOD PRESSURE: 77 MMHG | HEIGHT: 68 IN | WEIGHT: 195 LBS | HEART RATE: 86 BPM

## 2018-10-31 PROCEDURE — 93978 VASCULAR STUDY: CPT

## 2018-10-31 PROCEDURE — 99213 OFFICE O/P EST LOW 20 MIN: CPT

## 2018-11-20 ENCOUNTER — APPOINTMENT (OUTPATIENT)
Dept: VASCULAR SURGERY | Facility: CLINIC | Age: 73
End: 2018-11-20

## 2018-12-07 ENCOUNTER — OUTPATIENT (OUTPATIENT)
Dept: OUTPATIENT SERVICES | Facility: HOSPITAL | Age: 73
LOS: 1 days | End: 2018-12-07
Payer: MEDICARE

## 2018-12-07 ENCOUNTER — APPOINTMENT (OUTPATIENT)
Dept: MRI IMAGING | Facility: CLINIC | Age: 73
End: 2018-12-07
Payer: MEDICARE

## 2018-12-07 DIAGNOSIS — Z00.8 ENCOUNTER FOR OTHER GENERAL EXAMINATION: ICD-10-CM

## 2018-12-07 DIAGNOSIS — Z98.890 OTHER SPECIFIED POSTPROCEDURAL STATES: Chronic | ICD-10-CM

## 2018-12-07 PROCEDURE — 72148 MRI LUMBAR SPINE W/O DYE: CPT | Mod: 26

## 2018-12-07 PROCEDURE — 72148 MRI LUMBAR SPINE W/O DYE: CPT

## 2019-02-12 ENCOUNTER — APPOINTMENT (OUTPATIENT)
Dept: VASCULAR SURGERY | Facility: CLINIC | Age: 74
End: 2019-02-12

## 2019-02-28 NOTE — H&P PST ADULT - LAST ECHOCARDIOGRAM
-- Message is from the [facility]--    Reason for Call: patient is calling to say that she cant  referral asking if it can be faxed to [phone]    Caller Information       Type Contact Phone    02/28/2019 03:32 PM Phone (Incoming) Naomi Johnson (Self) [phone] (H)          Alternative phone number:      Turnaround time given to caller:   \"This message will be sent to [state Provider's name]. The clinical team will fulfill your request as soon as they review your message.\"     > 3 years

## 2019-04-25 ENCOUNTER — APPOINTMENT (OUTPATIENT)
Dept: SPINE | Facility: CLINIC | Age: 74
End: 2019-04-25
Payer: MEDICARE

## 2019-04-25 VITALS
DIASTOLIC BLOOD PRESSURE: 74 MMHG | BODY MASS INDEX: 30.01 KG/M2 | WEIGHT: 198 LBS | SYSTOLIC BLOOD PRESSURE: 130 MMHG | HEIGHT: 68 IN

## 2019-04-25 DIAGNOSIS — Z82.3 FAMILY HISTORY OF STROKE: ICD-10-CM

## 2019-04-25 PROCEDURE — 99213 OFFICE O/P EST LOW 20 MIN: CPT

## 2019-04-25 RX ORDER — POTASSIUM CHLORIDE 10 MEQ
10 CAPSULE, EXTENDED RELEASE ORAL
Refills: 0 | Status: ACTIVE | COMMUNITY

## 2019-04-25 RX ORDER — HYDROCODONE BITARTRATE AND ACETAMINOPHEN 10; 325 MG/1; MG/1
10-325 TABLET ORAL
Refills: 0 | Status: ACTIVE | COMMUNITY

## 2019-04-25 NOTE — REASON FOR VISIT
[Follow-Up: _____] : a [unfilled] follow-up visit [FreeTextEntry1] : Patient had SCS placed 7/2017, which was working nicely to reduce his lumbar pain. Recently had increased pain.

## 2019-11-11 ENCOUNTER — APPOINTMENT (OUTPATIENT)
Dept: VASCULAR SURGERY | Facility: CLINIC | Age: 74
End: 2019-11-11
Payer: MEDICARE

## 2019-11-11 VITALS
SYSTOLIC BLOOD PRESSURE: 137 MMHG | BODY MASS INDEX: 30.01 KG/M2 | WEIGHT: 198 LBS | DIASTOLIC BLOOD PRESSURE: 78 MMHG | HEIGHT: 68 IN | HEART RATE: 60 BPM

## 2019-11-11 PROCEDURE — 93978 VASCULAR STUDY: CPT

## 2019-11-11 PROCEDURE — 99213 OFFICE O/P EST LOW 20 MIN: CPT

## 2020-11-18 ENCOUNTER — APPOINTMENT (OUTPATIENT)
Dept: VASCULAR SURGERY | Facility: CLINIC | Age: 75
End: 2020-11-18
Payer: MEDICARE

## 2020-11-18 VITALS
TEMPERATURE: 97.1 F | DIASTOLIC BLOOD PRESSURE: 87 MMHG | SYSTOLIC BLOOD PRESSURE: 144 MMHG | HEIGHT: 68 IN | HEART RATE: 60 BPM | BODY MASS INDEX: 29.86 KG/M2 | WEIGHT: 197 LBS

## 2020-11-18 PROCEDURE — 93978 VASCULAR STUDY: CPT

## 2020-11-18 PROCEDURE — 99213 OFFICE O/P EST LOW 20 MIN: CPT

## 2021-04-12 ENCOUNTER — APPOINTMENT (OUTPATIENT)
Dept: ORTHOPEDIC SURGERY | Facility: CLINIC | Age: 76
End: 2021-04-12
Payer: MEDICARE

## 2021-04-12 DIAGNOSIS — M50.30 OTHER CERVICAL DISC DEGENERATION, UNSPECIFIED CERVICAL REGION: ICD-10-CM

## 2021-04-12 PROCEDURE — 99204 OFFICE O/P NEW MOD 45 MIN: CPT

## 2021-04-19 ENCOUNTER — TRANSCRIPTION ENCOUNTER (OUTPATIENT)
Age: 76
End: 2021-04-19

## 2021-04-19 ENCOUNTER — APPOINTMENT (OUTPATIENT)
Dept: ORTHOPEDIC SURGERY | Facility: CLINIC | Age: 76
End: 2021-04-19
Payer: MEDICARE

## 2021-04-19 VITALS
BODY MASS INDEX: 29.86 KG/M2 | DIASTOLIC BLOOD PRESSURE: 82 MMHG | WEIGHT: 197 LBS | SYSTOLIC BLOOD PRESSURE: 137 MMHG | HEIGHT: 68 IN | HEART RATE: 67 BPM

## 2021-04-19 PROCEDURE — 99214 OFFICE O/P EST MOD 30 MIN: CPT

## 2021-04-21 ENCOUNTER — APPOINTMENT (OUTPATIENT)
Dept: VASCULAR SURGERY | Facility: CLINIC | Age: 76
End: 2021-04-21

## 2021-04-21 ENCOUNTER — APPOINTMENT (OUTPATIENT)
Dept: VASCULAR SURGERY | Facility: CLINIC | Age: 76
End: 2021-04-21
Payer: MEDICARE

## 2021-04-21 VITALS
WEIGHT: 197 LBS | BODY MASS INDEX: 29.86 KG/M2 | TEMPERATURE: 97.2 F | HEIGHT: 68 IN | DIASTOLIC BLOOD PRESSURE: 78 MMHG | HEART RATE: 69 BPM | SYSTOLIC BLOOD PRESSURE: 151 MMHG

## 2021-04-21 PROCEDURE — 93978 VASCULAR STUDY: CPT

## 2021-04-21 PROCEDURE — 99213 OFFICE O/P EST LOW 20 MIN: CPT

## 2021-04-26 ENCOUNTER — OUTPATIENT (OUTPATIENT)
Dept: OUTPATIENT SERVICES | Facility: HOSPITAL | Age: 76
LOS: 1 days | End: 2021-04-26
Payer: MEDICARE

## 2021-04-26 ENCOUNTER — APPOINTMENT (OUTPATIENT)
Dept: CT IMAGING | Facility: CLINIC | Age: 76
End: 2021-04-26
Payer: MEDICARE

## 2021-04-26 ENCOUNTER — RESULT REVIEW (OUTPATIENT)
Age: 76
End: 2021-04-26

## 2021-04-26 DIAGNOSIS — Z98.890 OTHER SPECIFIED POSTPROCEDURAL STATES: Chronic | ICD-10-CM

## 2021-04-26 DIAGNOSIS — Z00.00 ENCOUNTER FOR GENERAL ADULT MEDICAL EXAMINATION WITHOUT ABNORMAL FINDINGS: ICD-10-CM

## 2021-04-26 PROCEDURE — 74174 CTA ABD&PLVS W/CONTRAST: CPT

## 2021-04-26 PROCEDURE — 82565 ASSAY OF CREATININE: CPT

## 2021-04-26 PROCEDURE — 74174 CTA ABD&PLVS W/CONTRAST: CPT | Mod: 26,QQ

## 2021-04-27 ENCOUNTER — TRANSCRIPTION ENCOUNTER (OUTPATIENT)
Age: 76
End: 2021-04-27

## 2021-04-28 ENCOUNTER — APPOINTMENT (OUTPATIENT)
Dept: VASCULAR SURGERY | Facility: CLINIC | Age: 76
End: 2021-04-28

## 2021-04-30 ENCOUNTER — APPOINTMENT (OUTPATIENT)
Dept: VASCULAR SURGERY | Facility: CLINIC | Age: 76
End: 2021-04-30
Payer: MEDICARE

## 2021-04-30 VITALS
SYSTOLIC BLOOD PRESSURE: 153 MMHG | DIASTOLIC BLOOD PRESSURE: 83 MMHG | HEIGHT: 68 IN | WEIGHT: 197 LBS | TEMPERATURE: 96.9 F | HEART RATE: 67 BPM | BODY MASS INDEX: 29.86 KG/M2

## 2021-04-30 PROCEDURE — 99213 OFFICE O/P EST LOW 20 MIN: CPT

## 2021-04-30 PROCEDURE — 93925 LOWER EXTREMITY STUDY: CPT

## 2021-05-11 ENCOUNTER — OUTPATIENT (OUTPATIENT)
Dept: OUTPATIENT SERVICES | Facility: HOSPITAL | Age: 76
LOS: 1 days | End: 2021-05-11
Payer: MEDICARE

## 2021-05-11 VITALS
RESPIRATION RATE: 16 BRPM | DIASTOLIC BLOOD PRESSURE: 98 MMHG | SYSTOLIC BLOOD PRESSURE: 150 MMHG | HEART RATE: 78 BPM | WEIGHT: 197.09 LBS | OXYGEN SATURATION: 97 % | HEIGHT: 68 IN | TEMPERATURE: 99 F

## 2021-05-11 DIAGNOSIS — Z98.890 OTHER SPECIFIED POSTPROCEDURAL STATES: Chronic | ICD-10-CM

## 2021-05-11 DIAGNOSIS — Z01.818 ENCOUNTER FOR OTHER PREPROCEDURAL EXAMINATION: ICD-10-CM

## 2021-05-11 DIAGNOSIS — Z96.89 PRESENCE OF OTHER SPECIFIED FUNCTIONAL IMPLANTS: Chronic | ICD-10-CM

## 2021-05-11 DIAGNOSIS — Z98.49 CATARACT EXTRACTION STATUS, UNSPECIFIED EYE: Chronic | ICD-10-CM

## 2021-05-11 DIAGNOSIS — Z79.899 OTHER LONG TERM (CURRENT) DRUG THERAPY: ICD-10-CM

## 2021-05-11 DIAGNOSIS — Z29.9 ENCOUNTER FOR PROPHYLACTIC MEASURES, UNSPECIFIED: ICD-10-CM

## 2021-05-11 DIAGNOSIS — I10 ESSENTIAL (PRIMARY) HYPERTENSION: ICD-10-CM

## 2021-05-11 DIAGNOSIS — Z90.79 ACQUIRED ABSENCE OF OTHER GENITAL ORGAN(S): Chronic | ICD-10-CM

## 2021-05-11 DIAGNOSIS — Z96.89 PRESENCE OF OTHER SPECIFIED FUNCTIONAL IMPLANTS: ICD-10-CM

## 2021-05-11 DIAGNOSIS — I72.3 ANEURYSM OF ILIAC ARTERY: ICD-10-CM

## 2021-05-11 DIAGNOSIS — G47.33 OBSTRUCTIVE SLEEP APNEA (ADULT) (PEDIATRIC): ICD-10-CM

## 2021-05-11 LAB
ALBUMIN SERPL ELPH-MCNC: 4.3 G/DL — SIGNIFICANT CHANGE UP (ref 3.3–5)
ALP SERPL-CCNC: 68 U/L — SIGNIFICANT CHANGE UP (ref 40–120)
ALT FLD-CCNC: 16 U/L — SIGNIFICANT CHANGE UP (ref 10–45)
ANION GAP SERPL CALC-SCNC: 12 MMOL/L — SIGNIFICANT CHANGE UP (ref 5–17)
AST SERPL-CCNC: 18 U/L — SIGNIFICANT CHANGE UP (ref 10–40)
BILIRUB SERPL-MCNC: 0.4 MG/DL — SIGNIFICANT CHANGE UP (ref 0.2–1.2)
BLD GP AB SCN SERPL QL: NEGATIVE — SIGNIFICANT CHANGE UP
BUN SERPL-MCNC: 23 MG/DL — SIGNIFICANT CHANGE UP (ref 7–23)
CALCIUM SERPL-MCNC: 9 MG/DL — SIGNIFICANT CHANGE UP (ref 8.4–10.5)
CHLORIDE SERPL-SCNC: 106 MMOL/L — SIGNIFICANT CHANGE UP (ref 96–108)
CO2 SERPL-SCNC: 26 MMOL/L — SIGNIFICANT CHANGE UP (ref 22–31)
CREAT SERPL-MCNC: 1.07 MG/DL — SIGNIFICANT CHANGE UP (ref 0.5–1.3)
GLUCOSE SERPL-MCNC: 78 MG/DL — SIGNIFICANT CHANGE UP (ref 70–99)
HCT VFR BLD CALC: 45.3 % — SIGNIFICANT CHANGE UP (ref 39–50)
HGB BLD-MCNC: 15.3 G/DL — SIGNIFICANT CHANGE UP (ref 13–17)
MCHC RBC-ENTMCNC: 29.7 PG — SIGNIFICANT CHANGE UP (ref 27–34)
MCHC RBC-ENTMCNC: 33.8 GM/DL — SIGNIFICANT CHANGE UP (ref 32–36)
MCV RBC AUTO: 88 FL — SIGNIFICANT CHANGE UP (ref 80–100)
NRBC # BLD: 0 /100 WBCS — SIGNIFICANT CHANGE UP (ref 0–0)
PLATELET # BLD AUTO: 212 K/UL — SIGNIFICANT CHANGE UP (ref 150–400)
POTASSIUM SERPL-MCNC: 3.6 MMOL/L — SIGNIFICANT CHANGE UP (ref 3.5–5.3)
POTASSIUM SERPL-SCNC: 3.6 MMOL/L — SIGNIFICANT CHANGE UP (ref 3.5–5.3)
PROT SERPL-MCNC: 7.4 G/DL — SIGNIFICANT CHANGE UP (ref 6–8.3)
RBC # BLD: 5.15 M/UL — SIGNIFICANT CHANGE UP (ref 4.2–5.8)
RBC # FLD: 12.5 % — SIGNIFICANT CHANGE UP (ref 10.3–14.5)
RH IG SCN BLD-IMP: POSITIVE — SIGNIFICANT CHANGE UP
SODIUM SERPL-SCNC: 144 MMOL/L — SIGNIFICANT CHANGE UP (ref 135–145)
WBC # BLD: 5.41 K/UL — SIGNIFICANT CHANGE UP (ref 3.8–10.5)
WBC # FLD AUTO: 5.41 K/UL — SIGNIFICANT CHANGE UP (ref 3.8–10.5)

## 2021-05-11 PROCEDURE — 85027 COMPLETE CBC AUTOMATED: CPT

## 2021-05-11 PROCEDURE — 80053 COMPREHEN METABOLIC PANEL: CPT

## 2021-05-11 PROCEDURE — 86850 RBC ANTIBODY SCREEN: CPT

## 2021-05-11 PROCEDURE — 86901 BLOOD TYPING SEROLOGIC RH(D): CPT

## 2021-05-11 PROCEDURE — 86900 BLOOD TYPING SEROLOGIC ABO: CPT

## 2021-05-11 PROCEDURE — G0463: CPT

## 2021-05-11 RX ORDER — CEFAZOLIN SODIUM 1 G
2000 VIAL (EA) INJECTION ONCE
Refills: 0 | Status: DISCONTINUED | OUTPATIENT
Start: 2021-05-20 | End: 2021-05-21

## 2021-05-11 RX ORDER — CHOLECALCIFEROL (VITAMIN D3) 125 MCG
1 CAPSULE ORAL
Qty: 0 | Refills: 0 | DISCHARGE

## 2021-05-11 NOTE — H&P PST ADULT - NEGATIVE NEUROLOGICAL SYMPTOMS
no weakness/no paresthesias/no generalized seizures/no focal seizures/no difficulty walking/no headache

## 2021-05-11 NOTE — H&P PST ADULT - NSICDXPASTMEDICALHX_GEN_ALL_CORE_FT
PAST MEDICAL HISTORY:  Back Pain     Chronic back pain     Folliculitis     History of DVT (deep vein thrombosis) s/p AAA repair 2005    HTN (Hypertension)     Hyperlipidemia     Lumbar disc disease     NATHAN on CPAP     Spinal stenosis      PAST MEDICAL HISTORY:  Chronic back pain     Folliculitis     History of DVT (deep vein thrombosis) s/p AAA repair 2007    HTN (Hypertension)     Hyperlipidemia     Lumbar disc disease     NATHAN on CPAP     Spinal stenosis

## 2021-05-11 NOTE — H&P PST ADULT - RS GEN PE MLT RESP DETAILS PC
airway patent/respirations non-labored/no chest wall tenderness/no intercostal retractions/no rales/no rhonchi

## 2021-05-11 NOTE — H&P PST ADULT - NSICDXPASTSURGICALHX_GEN_ALL_CORE_FT
PAST SURGICAL HISTORY:  H/O cataract extraction     History of AAA (Abdominal Aortic Aneurysm) Repair 2007    History of hydrocelectomy     History of lung biopsy 1985    S/P insertion of spinal cord stimulator 7/2017    S/P Partial Lobectomy of Lung scar tissue 1985    S/P TURP (transurethral resection of prostate) 1993    Torn Achilles Tendon right

## 2021-05-11 NOTE — H&P PST ADULT - ASSESSMENT
CAPRINI SCORE [CLOT updated 18]    AGE RELATED RISK FACTORS                                                       MOBILITY RELATED FACTORS  [ ] Age 41-60 years                                            (1 Point)                    [ ] Bed rest                                                        (1 Point)  [ ] Age: 61-74 years                                           (2 Points)                  [ ] Plaster cast                                                   (2 Points)  [x ] Age= 75 years                                              (3 Points)                    [ ] Bed bound for more than 72 hours                 (2 Points)    DISEASE RELATED RISK FACTORS                                               GENDER SPECIFIC FACTORS  [x ] Edema in the lower extremities                       (1 Point)              [ ] Pregnancy                                                     (1 Point)  [ ] Varicose veins                                               (1 Point)                     [ ] Post-partum < 6 weeks                                   (1 Point)             x[x ] BMI > 25 Kg/m2                                            (1 Point)                     [ ] Hormonal therapy  or oral contraception          (1 Point)                 [ ] Sepsis (in the previous month)                        (1 Point)               [ ] History of pregnancy complications                 (1 point)  [ ] Pneumonia or serious lung disease                                               [ ] Unexplained or recurrent                     (1 Point)           (in the previous month)                               (1 Point)  [ ] Abnormal pulmonary function test                     (1 Point)                 SURGERY RELATED RISK FACTORS  [ ] Acute myocardial infarction                              (1 Point)               [ ]  Section                                             (1 Point)  [ ] Congestive heart failure (in the previous month)  (1 Point)      [ ] Minor surgery                                                  (1 Point)   [ ] Inflammatory bowel disease                             (1 Point)               [ ] Arthroscopic surgery                                        (2 Points)  [ ] Central venous access                                      (2 Points)                [x ] General surgery lasting more than 45 minutes (2 points)  [ ] Present or previous malignancy                     (2 Points)                [ ] Elective arthroplasty                                         (5 points)    [ ] Stroke (in the previous month)                          (5 Points)                                                                                                                                                           HEMATOLOGY RELATED FACTORS                                                 TRAUMA RELATED RISK FACTORS  [ ] Prior episodes of VTE                                     (3 Points)                [ ] Fracture of the hip, pelvis, or leg                       (5 Points)  [ ] Positive family history for VTE                         (3 Points)             [ ] Acute spinal cord injury (in the previous month)  (5 Points)  [ ] Prothrombin 60919 A                                     (3 Points)               [ ] Paralysis  (less than 1 month)                             (5 Points)  [ ] Factor V Leiden                                             (3 Points)                  [ ] Multiple Trauma within 1 month                        (5 Points)  [ ] Lupus anticoagulants                                     (3 Points)                                                           [ ] Anticardiolipin antibodies                               (3 Points)                                                       [ ] High homocysteine in the blood                      (3 Points)                                             [ ] Other congenital or acquired thrombophilia      (3 Points)                                                [ ] Heparin induced thrombocytopenia                  (3 Points)                                     Total Score [    7      ]

## 2021-05-11 NOTE — H&P PST ADULT - NSICDXPROBLEM_GEN_ALL_CORE_FT
PROBLEM DIAGNOSES  Problem: Need for prophylactic measure  Assessment and Plan: The Caprini score indicates that this patient is at high risk for a VTE event (score 6 or greater). Surgical patients in this group will benefit from both pharmacologic prophylaxis and intermittent compression devices.  The surgical team will determine the balance between VTE risk and bleeding risk, and other clinical considerations    Problem: Benign hypertension  Assessment and Plan: continue with meds    Problem: NATHAN on CPAP  Assessment and Plan: NATHAN precaution    Problem: Spinal cord stimulator status  Assessment and Plan: Pt instruted to bring remote device    Problem: Medical marijuana use  Assessment and Plan: Release & waiver of liability agreement signed      Problem: Aneurysm of iliac artery  Assessment and Plan: Embolization of  left internal iliac artery aneurysm and endovascular stent  Labs- CBC, CMP, T&S  Continue with Aspirin  Pre op instructions discussed  Pre op medical & cardiac eval prior to OR

## 2021-05-17 ENCOUNTER — OUTPATIENT (OUTPATIENT)
Dept: OUTPATIENT SERVICES | Facility: HOSPITAL | Age: 76
LOS: 1 days | End: 2021-05-17

## 2021-05-17 DIAGNOSIS — Z11.52 ENCOUNTER FOR SCREENING FOR COVID-19: ICD-10-CM

## 2021-05-17 DIAGNOSIS — Z98.890 OTHER SPECIFIED POSTPROCEDURAL STATES: Chronic | ICD-10-CM

## 2021-05-17 DIAGNOSIS — Z90.79 ACQUIRED ABSENCE OF OTHER GENITAL ORGAN(S): Chronic | ICD-10-CM

## 2021-05-17 DIAGNOSIS — Z96.89 PRESENCE OF OTHER SPECIFIED FUNCTIONAL IMPLANTS: Chronic | ICD-10-CM

## 2021-05-17 DIAGNOSIS — Z98.49 CATARACT EXTRACTION STATUS, UNSPECIFIED EYE: Chronic | ICD-10-CM

## 2021-05-17 LAB — SARS-COV-2 RNA SPEC QL NAA+PROBE: SIGNIFICANT CHANGE UP

## 2021-05-19 ENCOUNTER — TRANSCRIPTION ENCOUNTER (OUTPATIENT)
Age: 76
End: 2021-05-19

## 2021-05-20 ENCOUNTER — INPATIENT (INPATIENT)
Facility: HOSPITAL | Age: 76
LOS: 0 days | Discharge: HOME | DRG: 271 | End: 2021-05-21
Attending: SURGERY | Admitting: SURGERY
Payer: MEDICARE

## 2021-05-20 VITALS
HEIGHT: 68 IN | TEMPERATURE: 98 F | DIASTOLIC BLOOD PRESSURE: 78 MMHG | SYSTOLIC BLOOD PRESSURE: 123 MMHG | HEART RATE: 69 BPM | WEIGHT: 197.09 LBS | OXYGEN SATURATION: 96 % | RESPIRATION RATE: 18 BRPM

## 2021-05-20 DIAGNOSIS — Z90.79 ACQUIRED ABSENCE OF OTHER GENITAL ORGAN(S): Chronic | ICD-10-CM

## 2021-05-20 DIAGNOSIS — Z98.49 CATARACT EXTRACTION STATUS, UNSPECIFIED EYE: Chronic | ICD-10-CM

## 2021-05-20 DIAGNOSIS — I72.3 ANEURYSM OF ILIAC ARTERY: ICD-10-CM

## 2021-05-20 DIAGNOSIS — Z98.890 OTHER SPECIFIED POSTPROCEDURAL STATES: Chronic | ICD-10-CM

## 2021-05-20 DIAGNOSIS — Z96.89 PRESENCE OF OTHER SPECIFIED FUNCTIONAL IMPLANTS: Chronic | ICD-10-CM

## 2021-05-20 LAB — RH IG SCN BLD-IMP: POSITIVE — SIGNIFICANT CHANGE UP

## 2021-05-20 PROCEDURE — 34707 EVASC RPR ILIO-ILIAC NDGFT: CPT | Mod: GC

## 2021-05-20 PROCEDURE — 37242 VASC EMBOLIZE/OCCLUDE ARTERY: CPT | Mod: GC

## 2021-05-20 PROCEDURE — 71045 X-RAY EXAM CHEST 1 VIEW: CPT | Mod: 26

## 2021-05-20 PROCEDURE — 76937 US GUIDE VASCULAR ACCESS: CPT | Mod: 26,GC

## 2021-05-20 RX ORDER — CHLORHEXIDINE GLUCONATE 213 G/1000ML
1 SOLUTION TOPICAL ONCE
Refills: 0 | Status: DISCONTINUED | OUTPATIENT
Start: 2021-05-20 | End: 2021-05-20

## 2021-05-20 RX ORDER — SODIUM CHLORIDE 9 MG/ML
1000 INJECTION INTRAMUSCULAR; INTRAVENOUS; SUBCUTANEOUS
Refills: 0 | Status: DISCONTINUED | OUTPATIENT
Start: 2021-05-20 | End: 2021-05-21

## 2021-05-20 RX ORDER — POTASSIUM CHLORIDE 20 MEQ
10 PACKET (EA) ORAL
Refills: 0 | Status: DISCONTINUED | OUTPATIENT
Start: 2021-05-20 | End: 2021-05-21

## 2021-05-20 RX ORDER — FUROSEMIDE 40 MG
40 TABLET ORAL DAILY
Refills: 0 | Status: DISCONTINUED | OUTPATIENT
Start: 2021-05-20 | End: 2021-05-21

## 2021-05-20 RX ORDER — CHLORHEXIDINE GLUCONATE 213 G/1000ML
1 SOLUTION TOPICAL
Refills: 0 | Status: DISCONTINUED | OUTPATIENT
Start: 2021-05-20 | End: 2021-05-21

## 2021-05-20 RX ORDER — AMLODIPINE BESYLATE 2.5 MG/1
10 TABLET ORAL DAILY
Refills: 0 | Status: DISCONTINUED | OUTPATIENT
Start: 2021-05-20 | End: 2021-05-21

## 2021-05-20 RX ORDER — SODIUM CHLORIDE 9 MG/ML
3 INJECTION INTRAMUSCULAR; INTRAVENOUS; SUBCUTANEOUS EVERY 8 HOURS
Refills: 0 | Status: DISCONTINUED | OUTPATIENT
Start: 2021-05-20 | End: 2021-05-20

## 2021-05-20 RX ORDER — OXYCODONE AND ACETAMINOPHEN 5; 325 MG/1; MG/1
2 TABLET ORAL EVERY 6 HOURS
Refills: 0 | Status: DISCONTINUED | OUTPATIENT
Start: 2021-05-20 | End: 2021-05-21

## 2021-05-20 RX ORDER — GABAPENTIN 400 MG/1
300 CAPSULE ORAL
Refills: 0 | Status: DISCONTINUED | OUTPATIENT
Start: 2021-05-20 | End: 2021-05-21

## 2021-05-20 RX ORDER — SIMVASTATIN 20 MG/1
20 TABLET, FILM COATED ORAL AT BEDTIME
Refills: 0 | Status: DISCONTINUED | OUTPATIENT
Start: 2021-05-20 | End: 2021-05-21

## 2021-05-20 RX ORDER — SODIUM CHLORIDE 9 MG/ML
10 INJECTION INTRAMUSCULAR; INTRAVENOUS; SUBCUTANEOUS
Refills: 0 | Status: DISCONTINUED | OUTPATIENT
Start: 2021-05-20 | End: 2021-05-21

## 2021-05-20 RX ORDER — ONDANSETRON 8 MG/1
4 TABLET, FILM COATED ORAL ONCE
Refills: 0 | Status: DISCONTINUED | OUTPATIENT
Start: 2021-05-20 | End: 2021-05-20

## 2021-05-20 RX ORDER — LIDOCAINE HCL 20 MG/ML
0.2 VIAL (ML) INJECTION ONCE
Refills: 0 | Status: DISCONTINUED | OUTPATIENT
Start: 2021-05-20 | End: 2021-05-20

## 2021-05-20 RX ORDER — ASPIRIN/CALCIUM CARB/MAGNESIUM 324 MG
81 TABLET ORAL DAILY
Refills: 0 | Status: DISCONTINUED | OUTPATIENT
Start: 2021-05-20 | End: 2021-05-21

## 2021-05-20 RX ORDER — FENTANYL CITRATE 50 UG/ML
25 INJECTION INTRAVENOUS
Refills: 0 | Status: DISCONTINUED | OUTPATIENT
Start: 2021-05-20 | End: 2021-05-20

## 2021-05-20 RX ADMIN — OXYCODONE AND ACETAMINOPHEN 2 TABLET(S): 5; 325 TABLET ORAL at 14:10

## 2021-05-20 RX ADMIN — OXYCODONE AND ACETAMINOPHEN 2 TABLET(S): 5; 325 TABLET ORAL at 21:00

## 2021-05-20 RX ADMIN — GABAPENTIN 300 MILLIGRAM(S): 400 CAPSULE ORAL at 17:28

## 2021-05-20 RX ADMIN — OXYCODONE AND ACETAMINOPHEN 2 TABLET(S): 5; 325 TABLET ORAL at 16:50

## 2021-05-20 RX ADMIN — SIMVASTATIN 20 MILLIGRAM(S): 20 TABLET, FILM COATED ORAL at 22:00

## 2021-05-20 RX ADMIN — SODIUM CHLORIDE 100 MILLILITER(S): 9 INJECTION INTRAMUSCULAR; INTRAVENOUS; SUBCUTANEOUS at 16:08

## 2021-05-20 RX ADMIN — OXYCODONE AND ACETAMINOPHEN 2 TABLET(S): 5; 325 TABLET ORAL at 19:57

## 2021-05-20 RX ADMIN — SODIUM CHLORIDE 100 MILLILITER(S): 9 INJECTION INTRAMUSCULAR; INTRAVENOUS; SUBCUTANEOUS at 19:57

## 2021-05-20 RX ADMIN — Medication 10 MILLIEQUIVALENT(S): at 17:30

## 2021-05-20 NOTE — PRE-ANESTHESIA EVALUATION ADULT - NSANTHPMHFT_GEN_ALL_CORE
spinal cord stimulator  cervical disc disease, will be having surgery after todays procedure   neck pain and occ right arm numbness

## 2021-05-20 NOTE — BRIEF OPERATIVE NOTE - NSICDXBRIEFPROCEDURE_GEN_ALL_CORE_FT
PROCEDURES:  Fluoroscopic angioplasty of left iliac artery with insertion of stent 20-May-2021 12:39:04  Noah Charles  Embolization, artery, hypogastric, using coil 20-May-2021 12:39:21  Noah Charles

## 2021-05-21 ENCOUNTER — TRANSCRIPTION ENCOUNTER (OUTPATIENT)
Age: 76
End: 2021-05-21

## 2021-05-21 VITALS
SYSTOLIC BLOOD PRESSURE: 146 MMHG | DIASTOLIC BLOOD PRESSURE: 91 MMHG | OXYGEN SATURATION: 96 % | HEART RATE: 77 BPM | RESPIRATION RATE: 18 BRPM | TEMPERATURE: 99 F

## 2021-05-21 LAB
ANION GAP SERPL CALC-SCNC: 14 MMOL/L — SIGNIFICANT CHANGE UP (ref 5–17)
BASOPHILS # BLD AUTO: 0.03 K/UL — SIGNIFICANT CHANGE UP (ref 0–0.2)
BASOPHILS NFR BLD AUTO: 0.4 % — SIGNIFICANT CHANGE UP (ref 0–2)
BUN SERPL-MCNC: 12 MG/DL — SIGNIFICANT CHANGE UP (ref 7–23)
CALCIUM SERPL-MCNC: 8 MG/DL — LOW (ref 8.4–10.5)
CHLORIDE SERPL-SCNC: 109 MMOL/L — HIGH (ref 96–108)
CO2 SERPL-SCNC: 20 MMOL/L — LOW (ref 22–31)
COVID-19 SPIKE DOMAIN AB INTERP: POSITIVE
COVID-19 SPIKE DOMAIN ANTIBODY RESULT: >250 U/ML — HIGH
CREAT SERPL-MCNC: 1.1 MG/DL — SIGNIFICANT CHANGE UP (ref 0.5–1.3)
EOSINOPHIL # BLD AUTO: 0.04 K/UL — SIGNIFICANT CHANGE UP (ref 0–0.5)
EOSINOPHIL NFR BLD AUTO: 0.5 % — SIGNIFICANT CHANGE UP (ref 0–6)
GLUCOSE SERPL-MCNC: 118 MG/DL — HIGH (ref 70–99)
HCT VFR BLD CALC: 41.5 % — SIGNIFICANT CHANGE UP (ref 39–50)
HGB BLD-MCNC: 13.5 G/DL — SIGNIFICANT CHANGE UP (ref 13–17)
IMM GRANULOCYTES NFR BLD AUTO: 0.3 % — SIGNIFICANT CHANGE UP (ref 0–1.5)
LYMPHOCYTES # BLD AUTO: 1.37 K/UL — SIGNIFICANT CHANGE UP (ref 1–3.3)
LYMPHOCYTES # BLD AUTO: 17.9 % — SIGNIFICANT CHANGE UP (ref 13–44)
MCHC RBC-ENTMCNC: 29.3 PG — SIGNIFICANT CHANGE UP (ref 27–34)
MCHC RBC-ENTMCNC: 32.5 GM/DL — SIGNIFICANT CHANGE UP (ref 32–36)
MCV RBC AUTO: 90.2 FL — SIGNIFICANT CHANGE UP (ref 80–100)
MONOCYTES # BLD AUTO: 0.63 K/UL — SIGNIFICANT CHANGE UP (ref 0–0.9)
MONOCYTES NFR BLD AUTO: 8.2 % — SIGNIFICANT CHANGE UP (ref 2–14)
NEUTROPHILS # BLD AUTO: 5.55 K/UL — SIGNIFICANT CHANGE UP (ref 1.8–7.4)
NEUTROPHILS NFR BLD AUTO: 72.7 % — SIGNIFICANT CHANGE UP (ref 43–77)
NRBC # BLD: 0 /100 WBCS — SIGNIFICANT CHANGE UP (ref 0–0)
PLATELET # BLD AUTO: 173 K/UL — SIGNIFICANT CHANGE UP (ref 150–400)
POTASSIUM SERPL-MCNC: 3.5 MMOL/L — SIGNIFICANT CHANGE UP (ref 3.5–5.3)
POTASSIUM SERPL-SCNC: 3.5 MMOL/L — SIGNIFICANT CHANGE UP (ref 3.5–5.3)
RBC # BLD: 4.6 M/UL — SIGNIFICANT CHANGE UP (ref 4.2–5.8)
RBC # FLD: 13 % — SIGNIFICANT CHANGE UP (ref 10.3–14.5)
SARS-COV-2 IGG+IGM SERPL QL IA: >250 U/ML — HIGH
SARS-COV-2 IGG+IGM SERPL QL IA: POSITIVE
SODIUM SERPL-SCNC: 143 MMOL/L — SIGNIFICANT CHANGE UP (ref 135–145)
WBC # BLD: 7.64 K/UL — SIGNIFICANT CHANGE UP (ref 3.8–10.5)
WBC # FLD AUTO: 7.64 K/UL — SIGNIFICANT CHANGE UP (ref 3.8–10.5)

## 2021-05-21 PROCEDURE — 99222 1ST HOSP IP/OBS MODERATE 55: CPT | Mod: 25

## 2021-05-21 PROCEDURE — 37221: CPT

## 2021-05-21 PROCEDURE — 85014 HEMATOCRIT: CPT

## 2021-05-21 PROCEDURE — C1894: CPT

## 2021-05-21 PROCEDURE — 82803 BLOOD GASES ANY COMBINATION: CPT

## 2021-05-21 PROCEDURE — 35400 ANGIOSCOPY: CPT

## 2021-05-21 PROCEDURE — 82330 ASSAY OF CALCIUM: CPT

## 2021-05-21 PROCEDURE — C1769: CPT

## 2021-05-21 PROCEDURE — 86923 COMPATIBILITY TEST ELECTRIC: CPT

## 2021-05-21 PROCEDURE — C1887: CPT

## 2021-05-21 PROCEDURE — 85025 COMPLETE CBC W/AUTO DIFF WBC: CPT

## 2021-05-21 PROCEDURE — 97161 PT EVAL LOW COMPLEX 20 MIN: CPT

## 2021-05-21 PROCEDURE — 84295 ASSAY OF SERUM SODIUM: CPT

## 2021-05-21 PROCEDURE — 51700 IRRIGATION OF BLADDER: CPT

## 2021-05-21 PROCEDURE — C1760: CPT

## 2021-05-21 PROCEDURE — 82435 ASSAY OF BLOOD CHLORIDE: CPT

## 2021-05-21 PROCEDURE — 84132 ASSAY OF SERUM POTASSIUM: CPT

## 2021-05-21 PROCEDURE — 76000 FLUOROSCOPY <1 HR PHYS/QHP: CPT

## 2021-05-21 PROCEDURE — U0005: CPT

## 2021-05-21 PROCEDURE — U0003: CPT

## 2021-05-21 PROCEDURE — C9803: CPT

## 2021-05-21 PROCEDURE — C1725: CPT

## 2021-05-21 PROCEDURE — 71045 X-RAY EXAM CHEST 1 VIEW: CPT

## 2021-05-21 PROCEDURE — 82565 ASSAY OF CREATININE: CPT

## 2021-05-21 PROCEDURE — 86769 SARS-COV-2 COVID-19 ANTIBODY: CPT

## 2021-05-21 PROCEDURE — 82947 ASSAY GLUCOSE BLOOD QUANT: CPT

## 2021-05-21 PROCEDURE — C1889: CPT

## 2021-05-21 PROCEDURE — 36245 INS CATH ABD/L-EXT ART 1ST: CPT

## 2021-05-21 PROCEDURE — 85018 HEMOGLOBIN: CPT

## 2021-05-21 PROCEDURE — 80048 BASIC METABOLIC PNL TOTAL CA: CPT

## 2021-05-21 PROCEDURE — 83605 ASSAY OF LACTIC ACID: CPT

## 2021-05-21 PROCEDURE — C1874: CPT

## 2021-05-21 RX ORDER — OXYCODONE HYDROCHLORIDE 5 MG/1
10 TABLET ORAL EVERY 6 HOURS
Refills: 0 | Status: DISCONTINUED | OUTPATIENT
Start: 2021-05-21 | End: 2021-05-21

## 2021-05-21 RX ORDER — POTASSIUM CHLORIDE 20 MEQ
20 PACKET (EA) ORAL
Refills: 0 | Status: COMPLETED | OUTPATIENT
Start: 2021-05-21 | End: 2021-05-21

## 2021-05-21 RX ADMIN — Medication 40 MILLIGRAM(S): at 05:24

## 2021-05-21 RX ADMIN — Medication 81 MILLIGRAM(S): at 14:02

## 2021-05-21 RX ADMIN — OXYCODONE HYDROCHLORIDE 10 MILLIGRAM(S): 5 TABLET ORAL at 14:07

## 2021-05-21 RX ADMIN — GABAPENTIN 300 MILLIGRAM(S): 400 CAPSULE ORAL at 19:10

## 2021-05-21 RX ADMIN — Medication 10 MILLIEQUIVALENT(S): at 05:23

## 2021-05-21 RX ADMIN — OXYCODONE AND ACETAMINOPHEN 2 TABLET(S): 5; 325 TABLET ORAL at 02:56

## 2021-05-21 RX ADMIN — AMLODIPINE BESYLATE 10 MILLIGRAM(S): 2.5 TABLET ORAL at 05:24

## 2021-05-21 RX ADMIN — GABAPENTIN 300 MILLIGRAM(S): 400 CAPSULE ORAL at 05:24

## 2021-05-21 RX ADMIN — Medication 10 MILLIEQUIVALENT(S): at 19:04

## 2021-05-21 RX ADMIN — Medication 20 MILLIEQUIVALENT(S): at 14:07

## 2021-05-21 RX ADMIN — Medication 20 MILLIEQUIVALENT(S): at 16:46

## 2021-05-21 RX ADMIN — OXYCODONE AND ACETAMINOPHEN 2 TABLET(S): 5; 325 TABLET ORAL at 02:03

## 2021-05-21 RX ADMIN — GABAPENTIN 300 MILLIGRAM(S): 400 CAPSULE ORAL at 00:20

## 2021-05-21 RX ADMIN — GABAPENTIN 300 MILLIGRAM(S): 400 CAPSULE ORAL at 14:02

## 2021-05-21 RX ADMIN — OXYCODONE HYDROCHLORIDE 10 MILLIGRAM(S): 5 TABLET ORAL at 08:56

## 2021-05-21 NOTE — DISCHARGE NOTE NURSING/CASE MANAGEMENT/SOCIAL WORK - PATIENT PORTAL LINK FT
You can access the FollowMyHealth Patient Portal offered by [facility] by registering at the following website: [email or web address]. By joining [employer]’s FollowMyHealth portal, you will also be able to view your health information using other applications (apps) compatible with our system.

## 2021-05-21 NOTE — DISCHARGE NOTE PROVIDER - NSDCMRMEDTOKEN_GEN_ALL_CORE_FT
aspirin 81 mg oral tablet: orally once a day (at bedtime)  Coenzyme Q10 10 mg oral capsule: 1 tab(s) orally once a day  felodipine 10 mg oral tablet, extended release: 2 tab(s) orally once a day  furosemide 40 mg oral tablet: 1 tab(s) orally once a day  gabapentin 300 mg oral capsule: 1  orally 4 times a day  HYDROcodone: 1  orally , As Needed  K-10: 1  orally 2 times a day  simvastatin 20 mg oral tablet: 1 tab(s) orally once a day (at bedtime)  tiZANidine 6 mg oral capsule: 1 cap(s) orally once a day, As Needed

## 2021-05-21 NOTE — CONSULT NOTE ADULT - ASSESSMENT
Gross hematuria  - likely due to prostate trauma from catheter   - encourage fluids  - monitor color  - no CBI indicated at this time  - if color continues to lighten then can give TOV--> if fails TOV can send home with catheter to follow up with Dr. Maynard  d/w Dr. Maynard  Gross hematuria  - likely due to prostate trauma from catheter   - encourage fluids  - monitor color  - no CBI indicated at this time  - if color continues to lighten then can give TOV--> if fails TOV can send home with catheter to follow up with Dr. Maynard  d/w Dr. Maynard       Addendum: Re-examined patient and irrigated about 150cc of clot, until crystal clear; removed catheter, awaiting void

## 2021-05-21 NOTE — DISCHARGE NOTE PROVIDER - CARE PROVIDER_API CALL
Marcelino Brooks)  Vascular Surgery  [address], [address] [place] [address]  Phone: [phone]  Fax: [phone]  Follow Up Time: 2 weeks    Aman Maynard)  Urology  [address], [address]  [place] [address]  Phone: [phone]  Fax: [phone]  Follow Up Time: 1 week

## 2021-05-21 NOTE — PHYSICAL THERAPY INITIAL EVALUATION ADULT - PERTINENT HX OF CURRENT PROBLEM, REHAB EVAL
74 yo M h/o AAA (s/p repair 2007), hypogastric artery aneurysm and is POD1 s/p (5/20) Fluoroscopic angioplasty of left iliac artery with insertion of stent and embolization of the left hypogastric artery 5/20.

## 2021-05-21 NOTE — DISCHARGE NOTE PROVIDER - CARE PROVIDERS DIRECT ADDRESSES
[email or web address]

## 2021-05-21 NOTE — DISCHARGE NOTE PROVIDER - PROVIDER TOKENS
PROVIDER:[TOKEN:[[id number]],FOLLOWUP:[2 weeks]],PROVIDER:[TOKEN:[[id number]],FOLLOWUP:[1 week]]

## 2021-05-21 NOTE — CONSULT NOTE ADULT - SUBJECTIVE AND OBJECTIVE BOX
HPI:    75 year old male with h/o HTN, HLD, AAA (s/p repair 2007), hypogastric artery aneurysm and is POD1 s/p (5/20) Fluoroscopic angioplasty of left iliac artery with insertion of stent and embolization of the left hypogastric artery, referred for gross hematuria, which started yesterday. Patient states he has had hematuria in the past.  In the remote past he has seen Dr. Mejia, and prior to that 35 years ago had a "procedure" on his prostate for BPH but does not remember the name.  He currently sees Dr. Maynard who  has been managing his Kang 6 low grade prostate cancer with surveillance.  Last PSA was 3.7, prostate MRI in 2018 negative.      Denies fever or chills, N/V/D, dysuria.  Prior to admission no voiding complaints.  Ambulating, denies constipation.  Only on ASA 81 mg.           PAST MEDICAL & SURGICAL HISTORY:  HTN (Hypertension)    Hyperlipidemia    Lumbar disc disease    Spinal stenosis    Chronic back pain    Folliculitis    History of DVT (deep vein thrombosis)  s/p AAA repair 2007    NATHAN on CPAP    History of AAA (Abdominal Aortic Aneurysm) Repair  2007    Torn Achilles Tendon  right    S/P Partial Lobectomy of Lung  scar tissue 1985    History of hydrocelectomy    H/O cataract extraction    History of lung biopsy  1985    S/P insertion of spinal cord stimulator  7/2017    S/P TURP (transurethral resection of prostate)  1993      MEDICATIONS  (STANDING):  amLODIPine   Tablet 10 milliGRAM(s) Oral daily  aspirin  chewable 81 milliGRAM(s) Oral daily  ceFAZolin   IVPB 2000 milliGRAM(s) IV Intermittent once  chlorhexidine 4% Liquid 1 Application(s) Topical <User Schedule>  furosemide    Tablet 40 milliGRAM(s) Oral daily  gabapentin 300 milliGRAM(s) Oral four times a day  oxyCODONE    IR 10 milliGRAM(s) Oral every 6 hours  potassium chloride    Tablet ER 20 milliEquivalent(s) Oral every 2 hours  potassium chloride   Solution 10 milliEquivalent(s) Oral two times a day  simvastatin 20 milliGRAM(s) Oral at bedtime    MEDICATIONS  (PRN):  sodium chloride 0.9% lock flush 10 milliLiter(s) IV Push every 1 hour PRN Pre/post blood products, medications, blood draw, and to maintain line patency    FAMILY HISTORY:  No pertinent family history in first degree relatives      Allergies    clindamycin (Rash)  Printing ink (Rash)    Intolerances      SOCIAL HISTORY:   Tobacco hx:    REVIEW OF SYSTEMS: Pertinent positives and negatives as stated in HPI, otherwise negative    Vital signs  T(C): 36.9 (05-21-21 @ 10:32), Max: 37.1 (05-20-21 @ 22:35)  HR: 71 (05-21-21 @ 10:32)  BP: 110/74 (05-21-21 @ 10:32)  SpO2: 96% (05-21-21 @ 10:32)  Wt(kg): --    Output    UOP    Physical Exam  Gen: NAD  Pulm: No respiratory distress, no subcostal retractions  CV: RRR, no JVD  Abd: Soft, NT, ND  : Circumcised, no lesions.  No discharge or blood at urethral meatus.  Testes descended bilaterally, nontender; pitt catheter with dark maroon urine, irrigated bladder with NS, no clots, now light pink; upon reexamination 2 hours later light translucent punch color.   MSK: No edema present    LABS:          05-21 @ 07:34    WBC 7.64  / Hct 41.5  / SCr 1.10     05-21    143  |  109<H>  |  12  ----------------------------<  118<H>  3.5   |  20<L>  |  1.10    Ca    8.0<L>      21 May 2021 07:34        Urine Cx:   Blood Cx:    RADIOLOGY:       HPI:    75 year old male with h/o HTN, HLD, AAA (s/p repair 2007), hypogastric artery aneurysm and is POD1 s/p (5/20) Fluoroscopic angioplasty of left iliac artery with insertion of stent and embolization of the left hypogastric artery, referred for gross hematuria, which started yesterday. Patient states he has had hematuria in the past.  In the remote past he has seen Dr. Mejia, and prior to that 35 years ago had a "procedure" on his prostate for BPH but does not remember the name.  He currently sees Dr. Maynard who  has been managing his Kang 6 low grade prostate cancer with surveillance.  Last PSA was 3.7, prostate MRI in 2018 negative.      Denies fever or chills, N/V/D, dysuria.  Prior to admission no voiding complaints.  Ambulating, denies constipation.  Only on ASA 81 mg.           PAST MEDICAL & SURGICAL HISTORY:  HTN (Hypertension)    Hyperlipidemia    Lumbar disc disease    Spinal stenosis    Chronic back pain    Folliculitis    History of DVT (deep vein thrombosis)  s/p AAA repair 2007    NATHAN on CPAP    History of AAA (Abdominal Aortic Aneurysm) Repair  2007    Torn Achilles Tendon  right    S/P Partial Lobectomy of Lung  scar tissue 1985    History of hydrocelectomy    H/O cataract extraction    History of lung biopsy  1985    S/P insertion of spinal cord stimulator  7/2017    S/P TURP (transurethral resection of prostate)  1993      MEDICATIONS  (STANDING):  amLODIPine   Tablet 10 milliGRAM(s) Oral daily  aspirin  chewable 81 milliGRAM(s) Oral daily  ceFAZolin   IVPB 2000 milliGRAM(s) IV Intermittent once  chlorhexidine 4% Liquid 1 Application(s) Topical <User Schedule>  furosemide    Tablet 40 milliGRAM(s) Oral daily  gabapentin 300 milliGRAM(s) Oral four times a day  oxyCODONE    IR 10 milliGRAM(s) Oral every 6 hours  potassium chloride    Tablet ER 20 milliEquivalent(s) Oral every 2 hours  potassium chloride   Solution 10 milliEquivalent(s) Oral two times a day  simvastatin 20 milliGRAM(s) Oral at bedtime    MEDICATIONS  (PRN):  sodium chloride 0.9% lock flush 10 milliLiter(s) IV Push every 1 hour PRN Pre/post blood products, medications, blood draw, and to maintain line patency    FAMILY HISTORY:  No pertinent family history in first degree relatives      Allergies    clindamycin (Rash)  Printing ink (Rash)    Intolerances      SOCIAL HISTORY:   Tobacco hx:    REVIEW OF SYSTEMS: Pertinent positives and negatives as stated in HPI, otherwise negative    Vital signs  T(C): 36.9 (05-21-21 @ 10:32), Max: 37.1 (05-20-21 @ 22:35)  HR: 71 (05-21-21 @ 10:32)  BP: 110/74 (05-21-21 @ 10:32)  SpO2: 96% (05-21-21 @ 10:32)  Wt(kg): --    Output    UOP    Physical Exam  Gen: NAD  Pulm: No respiratory distress, no subcostal retractions  CV: RRR, no JVD  Abd: Soft, NT, ND  : Circumcised, no lesions.  No discharge or blood at urethral meatus.  Testes descended bilaterally, nontender; pitt catheter with dark maroon urine, irrigated bladder with NS, no clots, now light pink; upon reexamination 2 hours later light translucent punch color.  MSK: No edema present    LABS:          05-21 @ 07:34    WBC 7.64  / Hct 41.5  / SCr 1.10     05-21    143  |  109<H>  |  12  ----------------------------<  118<H>  3.5   |  20<L>  |  1.10    Ca    8.0<L>      21 May 2021 07:34        Urine Cx:   Blood Cx:    RADIOLOGY:

## 2021-05-21 NOTE — DISCHARGE NOTE PROVIDER - HOSPITAL COURSE
75 year old male with h/o HTN, HLD,  DDD, spinal stenosis  s/p of permanent spinal cord stimulator (2017), AAA (s/p repair 2007) Pt c/o left groin pain x 3 months s/p vascular evaluation-s/p MRI/ CTA revealed-hypogastric artery aneurysm. Pt had vascular surgery consult- scheduled for embolization of left internal iliac artery aneurysm and endovascular  stent on 5/20/21 75 year old male with h/o HTN, HLD, AAA (s/p repair 2007), hypogastric artery aneurysm. He is s/p (5/20) Fluoroscopic angioplasty of left iliac artery with insertion of stent and embolization of the left hypogastric artery, recovering appropriately on the floors. Post operatively his diet was advanced, pain was controlled, had daily wound care and was placed back on any home medications. Urology was consulted for gross hematuria that they feel was related to prostate trauma from catheter. They recommended _____________________.    At the time of discharge, the patient was hemodynamically stable, tolerating PO diet, voiding urine, ambulating and was comfortable with adequate pain control. The patient was instructed to follow up with Dr. Brooks and Urology after discharge. The patient felt comfortable with discharge. The patient was discharged to home. The patient had no other issues. Physical therapy recommended no skilled PT needs. 75 year old male with h/o HTN, HLD, AAA (s/p repair 2007), hypogastric artery aneurysm. He is s/p (5/20) Fluoroscopic angioplasty of left iliac artery with insertion of stent and embolization of the left hypogastric artery, recovering appropriately on the floors. Post operatively his diet was advanced, pain was controlled, had daily wound care and was placed back on any home medications. Urology was consulted for gross hematuria that they feel was related to prostate trauma from catheter. They recommended outpatient follow up with Urologist.    At the time of discharge, the patient was hemodynamically stable, tolerating PO diet, voiding urine, ambulating and was comfortable with adequate pain control. The patient was instructed to follow up with Dr. Brooks and Urology after discharge. The patient felt comfortable with discharge. The patient was discharged to home. The patient had no other issues. Physical therapy recommended no skilled PT needs. 75 year old male with h/o HTN, HLD, AAA (s/p repair 2007), hypogastric artery aneurysm. He is s/p (5/20) Fluoroscopic angioplasty of left iliac artery with insertion of stent and embolization of the left hypogastric artery, recovering appropriately on the floors. Post operatively his diet was advanced, pain was controlled, had daily wound care and was placed back on any home medications. Urology was consulted for gross hematuria that they feel was related to prostate trauma from catheter. They recommended outpatient follow up with Urologist.    At the time of discharge, the patient was hemodynamically stable, tolerating PO diet, voiding urine, ambulating and was comfortable with adequate pain control. The patient was instructed to follow up with Dr. Brooks and Urology after discharge. The patient felt comfortable with discharge. The patient was discharged to home. The patient had no other issues. Physical therapy recommended no skilled PT needs.     Patient okay for discharge with follow up with Dr. Brooks.

## 2021-05-21 NOTE — PROVIDER CONTACT NOTE (OTHER) - BACKGROUND
Pt s/p left iliac stent and hypogastric embolization and coil. Hx of spinal stenosis, lumbar disc disease, chronic back pain, and HTN.

## 2021-05-21 NOTE — PHYSICAL THERAPY INITIAL EVALUATION ADULT - ADDITIONAL COMMENTS
Pt lives in private home with wife, ramp to enter +flight of stairs inside. Prior to admission, pt was I with all functional mobility and ADLs without AD.

## 2021-05-21 NOTE — PROVIDER CONTACT NOTE (OTHER) - ASSESSMENT
Pt states that he feels tingling in his left hand that started after surgery and is not going away. Denies numbness and tingling anywhere else. Pt verbalizes extreme concern.

## 2021-05-21 NOTE — PHYSICAL THERAPY INITIAL EVALUATION ADULT - DISCHARGE DISPOSITION, PT EVAL
Pt is I with functional mobility. Pt to be taken off PT program, however pt will remain on amb aide to maintain strength and functional independence./no skilled PT needs

## 2021-05-21 NOTE — DISCHARGE NOTE PROVIDER - NSDCCPTREATMENT_GEN_ALL_CORE_FT
PRINCIPAL PROCEDURE  Procedure: Embolization, artery, hypogastric, using coil  Findings and Treatment: internal Iliac aneusrym 3.5cm      SECONDARY PROCEDURE  Procedure: Fluoroscopic angioplasty of left iliac artery with insertion of stent  Findings and Treatment:

## 2021-05-21 NOTE — CHART NOTE - NSCHARTNOTEFT_GEN_A_CORE
Patient passed trial of void in evening. Per nursing, most recent void was clear without red tinge or zander hematuria. Patient okay for discharge.

## 2021-05-21 NOTE — PROGRESS NOTE ADULT - ATTENDING COMMENTS
As above  Stable from vasc  LBP mgmt  Abd/L groin/LEs stable/  Awating void/hematuria resolution/ fu.  DC when ready

## 2021-05-21 NOTE — DISCHARGE NOTE NURSING/CASE MANAGEMENT/SOCIAL WORK - NSDCPNINST_GEN_ALL_CORE
pt A&Ox4, able to verbalize understanding. VSS upon discharge. IV removed, site remains c/d/i. groin site c/d/i. no complaints of pain upon discharge. discharge instructions given to pt & spouse, verbalized understanding. patient ready for discharge, pending transport w/ wheelchair.

## 2021-05-21 NOTE — PROGRESS NOTE ADULT - SUBJECTIVE AND OBJECTIVE BOX
Vascular Surgery Progress Note    Overnight events:   No acute events  Had paresthesias on tips of fingers    SUBJECTIVE:  Patient seen this am.    OBJECTIVE:  Vital Signs Last 24 Hrs  T(C): 36.5 (21 May 2021 01:05), Max: 37.1 (20 May 2021 22:35)  T(F): 97.7 (21 May 2021 01:05), Max: 98.7 (20 May 2021 22:35)  HR: 70 (21 May 2021 01:05) (67 - 94)  BP: 138/74 (21 May 2021 01:05) (117/66 - 154/74)  BP(mean): 101 (20 May 2021 18:30) (85 - 107)  RR: 18 (21 May 2021 01:05) (14 - 18)  SpO2: 94% (21 May 2021 01:05) (93% - 97%)    Physical Examination:  GEN: NAD, resting quietly  HEENT: Left IJ in place  NEURO: AAOx3, CN II-XII grossly intact, no focal deficits    I&O's Detail    20 May 2021 07:01  -  21 May 2021 03:41  --------------------------------------------------------  IN:    Oral Fluid: 480 mL    sodium chloride 0.9%: 1300 mL  Total IN: 1780 mL    OUT:    Indwelling Catheter - Urethral (mL): 3450 mL  Total OUT: 3450 mL    Total NET: -1670 mL            LABS:                IMAGING:  [] Vascular Surgery Progress Note    Overnight events:   No acute events  Had paresthesias on tips of fingers since resolved    SUBJECTIVE:  Patient seen this am.    OBJECTIVE:  Vital Signs Last 24 Hrs  T(C): 36.5 (21 May 2021 01:05), Max: 37.1 (20 May 2021 22:35)  T(F): 97.7 (21 May 2021 01:05), Max: 98.7 (20 May 2021 22:35)  HR: 70 (21 May 2021 01:05) (67 - 94)  BP: 138/74 (21 May 2021 01:05) (117/66 - 154/74)  BP(mean): 101 (20 May 2021 18:30) (85 - 107)  RR: 18 (21 May 2021 01:05) (14 - 18)  SpO2: 94% (21 May 2021 01:05) (93% - 97%)    Physical Examination:  GEN: NAD, resting quietly  HEENT: Left IJ in place  NEURO: AAOx3, CN II-XII grossly intact, no focal deficits  León: bloody urine    I&O's Detail    20 May 2021 07:01  -  21 May 2021 03:41  --------------------------------------------------------  IN:    Oral Fluid: 480 mL    sodium chloride 0.9%: 1300 mL  Total IN: 1780 mL    OUT:    Indwelling Catheter - Urethral (mL): 3450 mL  Total OUT: 3450 mL    Total NET: -1670 mL            LABS:                IMAGING:  [] Vascular Surgery Progress Note    Overnight events:   No acute events overnight  Had paresthesias on tips of fingers since resolved    SUBJECTIVE:  Patient seen and examined at bedside this am. C/o mild back pain. Per pt, his hand paresthesias are "99.9% gone". States the central line in his neck is really bothering him. Has been having bloody urine postoperatively. No other complaints.    OBJECTIVE:  Vital Signs Last 24 Hrs  T(C): 36.5 (21 May 2021 01:05), Max: 37.1 (20 May 2021 22:35)  T(F): 97.7 (21 May 2021 01:05), Max: 98.7 (20 May 2021 22:35)  HR: 70 (21 May 2021 01:05) (67 - 94)  BP: 138/74 (21 May 2021 01:05) (117/66 - 154/74)  BP(mean): 101 (20 May 2021 18:30) (85 - 107)  RR: 18 (21 May 2021 01:05) (14 - 18)  SpO2: 94% (21 May 2021 01:05) (93% - 97%)    Physical Examination:  GEN: NAD, resting quietly  HEENT: Left IJ in place  NEURO: AAOx3, CN II-XII grossly intact, no focal deficits; normal motor and sensory function in hands.   Romelia: gross hematuria    I&O's Detail    20 May 2021 07:01  -  21 May 2021 03:41  --------------------------------------------------------  IN:    Oral Fluid: 480 mL    sodium chloride 0.9%: 1300 mL  Total IN: 1780 mL    OUT:    Indwelling Catheter - Urethral (mL): 3450 mL  Total OUT: 3450 mL    Total NET: -1670 mL            LABS:                IMAGING:  []

## 2021-05-21 NOTE — PROGRESS NOTE ADULT - ASSESSMENT
75 year old male with h/o HTN, HLD, AAA (s/p repair 2007), hypogastric artery aneurysm and is s/p (5/20) embolization of the left hypogastric artery.    Plan:  - oxycodone, tylenol, gabapentin for pain  - asa, statin  - IVF  - Regular diet  - home amlodipine, furosemide  - am labs    [id number]  vascular   75 year old male with h/o HTN, HLD, AAA (s/p repair 2007), hypogastric artery aneurysm and is s/p (5/20) embolization of the left hypogastric artery.    Plan:  - oxycodone, tylenol, gabapentin for pain  - asa, statin  - IVF  - Regular diet  - pitt  - home amlodipine, furosemide  - am labs    [id number]  vascular   75 year old male with h/o HTN, HLD, AAA (s/p repair 2007), hypogastric artery aneurysm and is s/p (5/20) embolization of the left hypogastric artery.    Plan:  - oxycodone, tylenol, gabapentin for pain  - asa, statin  - IVF  - Regular diet  - DC pitt pending urology consult  - home amlodipine, furosemide  - Urology consult for hematuria  - am labs  - DC central line  - Likely DC home pending TOV    [id number]  vascular   75 year old male with h/o HTN, HLD, AAA (s/p repair 2007), hypogastric artery aneurysm and is POD1 s/p (5/20) Fluoroscopic angioplasty of left iliac artery with insertion of stent and embolization of the left hypogastric artery, recovering appropriately on the floors.    Plan:  - oxycodone, tylenol, gabapentin for pain  - asa, statin  - IVF  - Regular diet  - DC pitt pending urology consult  - home amlodipine, furosemide  - Urology consult for hematuria  - am labs  - DC central line  - Likely DC home pending TOV    [id number]  vascular

## 2021-05-21 NOTE — PHYSICAL THERAPY INITIAL EVALUATION ADULT - DID THE PATIENT HAVE SURGERY?
Fluoroscopic angioplasty of left iliac artery with insertion of stent and embolization of the left hypogastric artery/yes

## 2021-05-21 NOTE — PROVIDER CONTACT NOTE (OTHER) - ACTION/TREATMENT ORDERED:
MD Silva made aware and will assess patient at bedside. No further interventions needed at this time and will continue to monitor the tingling.

## 2021-05-25 PROBLEM — G47.33 OBSTRUCTIVE SLEEP APNEA (ADULT) (PEDIATRIC): Chronic | Status: ACTIVE | Noted: 2021-05-11

## 2021-05-25 PROBLEM — Z86.718 PERSONAL HISTORY OF OTHER VENOUS THROMBOSIS AND EMBOLISM: Chronic | Status: ACTIVE | Noted: 2017-07-10

## 2021-06-04 ENCOUNTER — APPOINTMENT (OUTPATIENT)
Dept: VASCULAR SURGERY | Facility: CLINIC | Age: 76
End: 2021-06-04
Payer: MEDICARE

## 2021-06-04 VITALS — HEIGHT: 68 IN | BODY MASS INDEX: 29.55 KG/M2 | WEIGHT: 195 LBS

## 2021-06-04 VITALS — SYSTOLIC BLOOD PRESSURE: 156 MMHG | HEART RATE: 66 BPM | DIASTOLIC BLOOD PRESSURE: 88 MMHG

## 2021-06-04 PROCEDURE — 99024 POSTOP FOLLOW-UP VISIT: CPT

## 2021-07-01 ENCOUNTER — OUTPATIENT (OUTPATIENT)
Dept: OUTPATIENT SERVICES | Facility: HOSPITAL | Age: 76
LOS: 1 days | End: 2021-07-01
Payer: MEDICARE

## 2021-07-01 ENCOUNTER — APPOINTMENT (OUTPATIENT)
Dept: CT IMAGING | Facility: IMAGING CENTER | Age: 76
End: 2021-07-01
Payer: MEDICARE

## 2021-07-01 ENCOUNTER — RESULT REVIEW (OUTPATIENT)
Age: 76
End: 2021-07-01

## 2021-07-01 DIAGNOSIS — Z96.89 PRESENCE OF OTHER SPECIFIED FUNCTIONAL IMPLANTS: Chronic | ICD-10-CM

## 2021-07-01 DIAGNOSIS — Z00.8 ENCOUNTER FOR OTHER GENERAL EXAMINATION: ICD-10-CM

## 2021-07-01 DIAGNOSIS — Z90.79 ACQUIRED ABSENCE OF OTHER GENITAL ORGAN(S): Chronic | ICD-10-CM

## 2021-07-01 DIAGNOSIS — Z98.49 CATARACT EXTRACTION STATUS, UNSPECIFIED EYE: Chronic | ICD-10-CM

## 2021-07-01 DIAGNOSIS — Z98.890 OTHER SPECIFIED POSTPROCEDURAL STATES: Chronic | ICD-10-CM

## 2021-07-01 PROCEDURE — 74174 CTA ABD&PLVS W/CONTRAST: CPT

## 2021-07-01 PROCEDURE — 74174 CTA ABD&PLVS W/CONTRAST: CPT | Mod: 26,MH

## 2021-07-01 PROCEDURE — 82565 ASSAY OF CREATININE: CPT

## 2021-07-13 ENCOUNTER — APPOINTMENT (OUTPATIENT)
Dept: VASCULAR SURGERY | Facility: CLINIC | Age: 76
End: 2021-07-13
Payer: MEDICARE

## 2021-07-13 PROCEDURE — 99024 POSTOP FOLLOW-UP VISIT: CPT

## 2021-10-04 ENCOUNTER — APPOINTMENT (OUTPATIENT)
Dept: VASCULAR SURGERY | Facility: CLINIC | Age: 76
End: 2021-10-04

## 2021-10-08 ENCOUNTER — APPOINTMENT (OUTPATIENT)
Dept: VASCULAR SURGERY | Facility: CLINIC | Age: 76
End: 2021-10-08
Payer: MEDICARE

## 2021-10-08 VITALS — SYSTOLIC BLOOD PRESSURE: 176 MMHG | DIASTOLIC BLOOD PRESSURE: 83 MMHG | HEART RATE: 70 BPM

## 2021-10-08 VITALS
BODY MASS INDEX: 30.01 KG/M2 | HEART RATE: 72 BPM | SYSTOLIC BLOOD PRESSURE: 160 MMHG | HEIGHT: 68 IN | WEIGHT: 198 LBS | TEMPERATURE: 98 F | DIASTOLIC BLOOD PRESSURE: 100 MMHG

## 2021-10-08 PROCEDURE — 93978 VASCULAR STUDY: CPT

## 2021-10-08 PROCEDURE — 99213 OFFICE O/P EST LOW 20 MIN: CPT

## 2021-12-01 ENCOUNTER — APPOINTMENT (OUTPATIENT)
Dept: VASCULAR SURGERY | Facility: CLINIC | Age: 76
End: 2021-12-01

## 2022-03-07 ENCOUNTER — APPOINTMENT (OUTPATIENT)
Dept: ORTHOPEDIC SURGERY | Facility: CLINIC | Age: 77
End: 2022-03-07
Payer: MEDICARE

## 2022-03-07 VITALS
HEART RATE: 87 BPM | HEIGHT: 68 IN | BODY MASS INDEX: 29.55 KG/M2 | SYSTOLIC BLOOD PRESSURE: 158 MMHG | DIASTOLIC BLOOD PRESSURE: 84 MMHG | WEIGHT: 195 LBS

## 2022-03-07 PROCEDURE — 99215 OFFICE O/P EST HI 40 MIN: CPT

## 2022-03-15 ENCOUNTER — RESULT REVIEW (OUTPATIENT)
Age: 77
End: 2022-03-15

## 2022-03-15 ENCOUNTER — OUTPATIENT (OUTPATIENT)
Dept: OUTPATIENT SERVICES | Facility: HOSPITAL | Age: 77
LOS: 1 days | End: 2022-03-15
Payer: MEDICARE

## 2022-03-15 ENCOUNTER — APPOINTMENT (OUTPATIENT)
Dept: MRI IMAGING | Facility: CLINIC | Age: 77
End: 2022-03-15
Payer: MEDICARE

## 2022-03-15 DIAGNOSIS — Z98.49 CATARACT EXTRACTION STATUS, UNSPECIFIED EYE: Chronic | ICD-10-CM

## 2022-03-15 DIAGNOSIS — Z98.890 OTHER SPECIFIED POSTPROCEDURAL STATES: Chronic | ICD-10-CM

## 2022-03-15 DIAGNOSIS — Z90.79 ACQUIRED ABSENCE OF OTHER GENITAL ORGAN(S): Chronic | ICD-10-CM

## 2022-03-15 DIAGNOSIS — M48.02 SPINAL STENOSIS, CERVICAL REGION: ICD-10-CM

## 2022-03-15 DIAGNOSIS — Z96.89 PRESENCE OF OTHER SPECIFIED FUNCTIONAL IMPLANTS: Chronic | ICD-10-CM

## 2022-03-15 PROCEDURE — 72141 MRI NECK SPINE W/O DYE: CPT | Mod: 26,MH

## 2022-03-15 PROCEDURE — 72141 MRI NECK SPINE W/O DYE: CPT | Mod: MH

## 2022-03-30 ENCOUNTER — APPOINTMENT (OUTPATIENT)
Dept: ORTHOPEDIC SURGERY | Facility: CLINIC | Age: 77
End: 2022-03-30
Payer: MEDICARE

## 2022-03-30 VITALS
DIASTOLIC BLOOD PRESSURE: 68 MMHG | SYSTOLIC BLOOD PRESSURE: 114 MMHG | HEIGHT: 68 IN | WEIGHT: 190 LBS | HEART RATE: 81 BPM | BODY MASS INDEX: 28.79 KG/M2

## 2022-03-30 PROCEDURE — 99214 OFFICE O/P EST MOD 30 MIN: CPT

## 2022-04-15 ENCOUNTER — APPOINTMENT (OUTPATIENT)
Dept: VASCULAR SURGERY | Facility: CLINIC | Age: 77
End: 2022-04-15
Payer: MEDICARE

## 2022-04-15 VITALS — DIASTOLIC BLOOD PRESSURE: 96 MMHG | SYSTOLIC BLOOD PRESSURE: 153 MMHG | HEART RATE: 74 BPM

## 2022-04-15 VITALS
HEART RATE: 67 BPM | TEMPERATURE: 96 F | DIASTOLIC BLOOD PRESSURE: 94 MMHG | HEIGHT: 68 IN | BODY MASS INDEX: 28.79 KG/M2 | SYSTOLIC BLOOD PRESSURE: 156 MMHG | WEIGHT: 190 LBS

## 2022-04-15 PROCEDURE — 93978 VASCULAR STUDY: CPT

## 2022-04-15 PROCEDURE — 99213 OFFICE O/P EST LOW 20 MIN: CPT

## 2022-05-12 ENCOUNTER — APPOINTMENT (OUTPATIENT)
Dept: ORTHOPEDIC SURGERY | Facility: CLINIC | Age: 77
End: 2022-05-12
Payer: MEDICARE

## 2022-05-12 VITALS
HEIGHT: 68 IN | HEART RATE: 69 BPM | SYSTOLIC BLOOD PRESSURE: 146 MMHG | WEIGHT: 192 LBS | BODY MASS INDEX: 29.1 KG/M2 | DIASTOLIC BLOOD PRESSURE: 82 MMHG

## 2022-05-12 DIAGNOSIS — M48.07 SPINAL STENOSIS, LUMBOSACRAL REGION: ICD-10-CM

## 2022-05-12 PROCEDURE — 99214 OFFICE O/P EST MOD 30 MIN: CPT

## 2022-05-12 NOTE — PHYSICAL EXAM
[Normal] : Gait: normal [Pena's Sign] : negative Pena's sign [Pronator Drift] : negative pronator drift [SLR] : negative straight leg raise [de-identified] : 5 out of 5 motor strength, sensation is intact and symmetrical full range of motion flexion extension and rotation, no palpatory tenderness full range of motion of hips knees shoulders and elbows (all four extremities), no atrophy, negative straight leg raise, no pathological reflexes, no swelling, normal ambulation, no apparent distress skin is intact, no palpable lymph nodes, no upper or lower extremity instability, alert and oriented x3 and normal mood. Normal finger-to nose test. ? left Pena's. [de-identified] : EXAM: [id number] - MR SPINE CERVICAL - ORDERED BY: GODFREY ARRINGTON\par \par \par PROCEDURE DATE: 03/15/2022\par \par \par \par INTERPRETATION: CLINICAL INDICATION:Right-sided neck pain for one year. Spinal cord stimulator.\par \par Multiplanar Multisequence MR of the CERVICAL SPINE without contrast\par \par Prior Studies: MRI of the cervical spine from April 17, 2017.\par \par FINDINGS:\par \par ALIGNMENT: There is straightening of the cervical lordosis. There is mild retrolisthesis of C5 on C6. There is trace degenerative grade 1 anterolisthesis of C4 on C5. Findings are grossly unchanged.\par \par VERTEBRAL BODIES: There is no vertebral body compression deformity.\par \par DISC SPACES: There is multilevel disc desiccation. There is moderate disc height loss at C3/C4 and C5/C6.\par \par MARROW: Prominent edematous end plate changes are seen at C3/C4. There is osseous edema about the right C3/C4 facet joint which is new compared to the prior examination and suggestive of osseous stress reaction.\par \par IMAGED BRAIN: Within normal limits.\par \par CERVICAL CORD: There is trace T2 prolongation within the cervical cord at the level of C3/C4 with mild associated cord atrophy. This is progressed compared to the prior examination and is likely related to myelomalacia given the prominent stenosis at this level.\par \par IMAGED NECK SOFT TISSUES: Trace right mastoid air cell effusion is noted.\par \par The findings at the individual levels are as follows:\par \par C1/2: There is moderate to severe arthrosis between the anterior arch of C1 and the dens, unchanged.\par \par C2/3: There is a minimal disc bulge with posterior osseous ridging which is asymmetric to the right. There is bilateral uncovertebral hypertrophy. There is right greater than left facet arthrosis. Findings result in flattening of the ventral thecal sac with mild contacting of the cord. There is severe right neural foraminal narrowing. Findings appear mildly progressed compared to the prior examination.\par \par C3/4: There is a diffuse disc bulge with posterior osseous ridging and bilateral uncovertebral hypertrophy. There is severe right and mild left facet arthrosis. There is near complete circumferential effacement of the CSF column. There is contacting and flattening of the cord both ventrally and dorsally. There is severe bilateral neural foraminal narrowing. These findings are progressed compared to the prior examination.\par \par C4/5: There is a mild diffuse disc bulge bilateral uncovertebral hypertrophy. There is bilateral facet arthrosis. Findings result in flattening of the ventral thecal sac without contacting of the cord. There is moderate bilateral neural foraminal narrowing. Findings grossly unchanged.\par \par C5/6: There is mild diffuse disc bulge with left greater than right uncovertebral hypertrophy. There is bilateral facet arthrosis. Findings results in flattening of the ventral thecal sac with mild effacement of the left lateral recess. There is mild contacting of the exiting left C6 nerve root. There is mild right neural foraminal narrowing. There is flattening of the ventral thecal sac without contacting the cord. Findings are slightly more prominent compared to the prior examination.\par \par C6/7: Small diffuse disc bulge posterior osseous ridging and bilateral uncovertebral hypertrophy. Findings result in flattening of ventral thecal sac without contacting the cord. There is mild bilateral neural foraminal narrowing. Findings grossly unchanged.\par \par C7/T1: There is no spinal canal or neural foraminal narrowing.\par \par IMPRESSION:\par \par Multilevel cervical spondylosis. Mild retrolisthesis of C5 on C6. Trace degenerative grade 1 anterolisthesis of C4 on C5. Osseous edema about the right C3/C4 facet joint which is new compared to the prior examination and suggestive of osseous stress reaction.\par \par Trace T2 prolongation within the cervical cord at the level of C3/C4 with mild associated cord atrophy. This is progressed compared to the prior examination and is likely related to myelomalacia given the prominent stenosis at this level.\par \par C2/3: There is flattening of the ventral thecal sac with mild contacting of the cord. There is severe right neural foraminal narrowing. Findings appear mildly progressed compared to the prior examination.\par \par C3/4: There is near complete circumferential effacement of the CSF column. There is contacting and flattening of the cord both ventrally and dorsally. There is severe bilateral neural foraminal narrowing. These findings are progressed compared to the prior examination.\par \par C5/6: There is flattening of the ventral thecal sac with mild effacement of the left lateral recess. There is mild contacting of the exiting left C6 nerve root. There is mild right neural foraminal narrowing. There is flattening of the ventral thecal sac without contacting the cord. Findings are grossly slightly more prominent compared to the prior examination.\par \par --- End of Report ---\par \par

## 2022-05-12 NOTE — DISCUSSION/SUMMARY
[de-identified] : Cervical stenosis.\par No issues.\par He is not showing any signs of cervical myelopathy today and feels quite well.\par We discussed all options including surgery.\par Since he is not having any issues with his upper extremities or gait he will follow-up in 3 months.\par If he worsens we will consider surgery.\par All options discussed and patient involved in decision making process including rest, medicine, home exercise, acupuncture, Chiropractic care, Physical Therapy, Pain management, and last resort surgery. All questions were answered, all alternatives discussed and the patient is in complete agreement with that plan. Follow-up appointment as instructed. Any issues and the patient will call or come in sooner. \par Wife agrees with the plan.

## 2022-05-12 NOTE — HISTORY OF PRESENT ILLNESS
[Stable] : stable [de-identified] : 76 year male presents for follow up of cervical stenosis. \par Pain radiates down the RUE to the elbow.\par Has numbness/tingling. \par Denies weakness of arms and legs. \par Has been taking hydrocodone and medical marijuana  with minimal relief.\par Has participated with PT in 2021 and had felt improvement. \par No issues with hands or gait.\par Denies history of CATHERINE, states he has done LESI in the past. \par Patient states he's not dropping objects and has no issues with fine motor skills.\par Surgery was discussed at prior visits. 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  \par Prior MRI of the cervical spine has been reviewed at the last visit (03/30/2022). \par No arm symptoms today.\par No fever chills sweats nausea vomiting no bowel or bladder dysfunction, no recent weight loss or gain no night pain. This history is in addition to the intake form that I personally reviewed. \par Here with wife. \par Recent MRI returns to discuss treatment options.

## 2022-08-15 ENCOUNTER — APPOINTMENT (OUTPATIENT)
Dept: ORTHOPEDIC SURGERY | Facility: CLINIC | Age: 77
End: 2022-08-15

## 2022-08-15 VITALS — BODY MASS INDEX: 29.55 KG/M2 | HEIGHT: 68 IN | WEIGHT: 195 LBS

## 2022-08-15 PROCEDURE — 99214 OFFICE O/P EST MOD 30 MIN: CPT

## 2022-08-15 NOTE — HISTORY OF PRESENT ILLNESS
[Stable] : stable [de-identified] : 77 year old male presents for follow up of cervical stenosis. \par Pain radiates down the RUE to the elbow.\par Has numbness/tingling. \par Denies weakness of arms and legs. \par Has been taking hydrocodone and medical marijuana  with minimal relief.\par Has participated with PT in 2021 and had felt improvement. \par No issues with hands or gait.\par Denies history of CATHERINE, states he has done LESI in the past. \par Patient states he's not dropping objects and has no issues with fine motor skills.\par Surgery was discussed at prior visits. 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  \par Prior MRI of the cervical spine has been reviewed.\par No fever chills sweats nausea vomiting no bowel or bladder dysfunction, no recent weight loss or gain no night pain. This history is in addition to the intake form that I personally reviewed. \par

## 2022-08-15 NOTE — PHYSICAL EXAM
Will be going to [facility], needs to have a Covid screen on 9/3/21. She wants to get it at [facility]. We need to fax order to them at [phone].     Peewee please change diagnosis if you know of one for \"pretravel screen\" [Normal] : Gait: normal [Pena's Sign] : negative Pena's sign [Pronator Drift] : negative pronator drift [SLR] : negative straight leg raise [de-identified] : 5 out of 5 motor strength, sensation is intact and symmetrical full range of motion flexion extension and rotation, no palpatory tenderness full range of motion of hips knees shoulders and elbows (all four extremities), no atrophy, negative straight leg raise, no pathological reflexes, no swelling, normal ambulation, no apparent distress skin is intact, no palpable lymph nodes, no upper or lower extremity instability, alert and oriented x3 and normal mood. Normal finger-to nose test.  No upper motor neuron findings.

## 2022-08-15 NOTE — REVIEW OF SYSTEMS
[Joint Pain] : joint pain [Joint Stiffness] : joint stiffness [Negative] : Heme/Lymph [FreeTextEntry9] : Upper Back Pain/Neck Pain

## 2022-08-15 NOTE — ADDENDUM
[FreeTextEntry1] : This note was written by Lavell Flynn on 08/15/2022 acting as scribe for Dr. David Coyle M.D.\par \par I, David Coyle MD, have read and attest that all the information, medical decision making and discharge instructions within are true and accurate.

## 2022-08-15 NOTE — DISCUSSION/SUMMARY
[de-identified] : Cervical stenosis.\par No issues. Doing well.\par He is not showing any signs of cervical myelopathy today and feels quite well.\par We discussed all options including surgery.\par Avoid neck strain and sprain.\par F/U 6 months. Call if any issues.\par All options discussed and patient involved in decision making process including rest, medicine, home exercise, acupuncture, Chiropractic care, Physical Therapy, Pain management, and last resort surgery. All questions were answered, all alternatives discussed and the patient is in complete agreement with that plan. Follow-up appointment as instructed. Any issues and the patient will call or come in sooner. \par Wife agrees with the plan.

## 2022-08-15 NOTE — REASON FOR VISIT
[Follow-Up Visit] : a follow-up visit for [Neck Pain] : neck pain [FreeTextEntry2] : Upper Back Pain/Neck Pain

## 2022-10-21 ENCOUNTER — APPOINTMENT (OUTPATIENT)
Dept: VASCULAR SURGERY | Facility: CLINIC | Age: 77
End: 2022-10-21

## 2022-10-21 VITALS
BODY MASS INDEX: 29.55 KG/M2 | DIASTOLIC BLOOD PRESSURE: 80 MMHG | HEIGHT: 68 IN | WEIGHT: 195 LBS | HEART RATE: 65 BPM | SYSTOLIC BLOOD PRESSURE: 123 MMHG | TEMPERATURE: 97.6 F

## 2022-10-21 PROCEDURE — 93978 VASCULAR STUDY: CPT

## 2022-10-21 PROCEDURE — 99213 OFFICE O/P EST LOW 20 MIN: CPT

## 2023-01-26 ENCOUNTER — OUTPATIENT (OUTPATIENT)
Dept: OUTPATIENT SERVICES | Facility: HOSPITAL | Age: 78
LOS: 1 days | End: 2023-01-26
Payer: MEDICARE

## 2023-01-26 ENCOUNTER — APPOINTMENT (OUTPATIENT)
Dept: CT IMAGING | Facility: IMAGING CENTER | Age: 78
End: 2023-01-26
Payer: MEDICARE

## 2023-01-26 DIAGNOSIS — Z90.79 ACQUIRED ABSENCE OF OTHER GENITAL ORGAN(S): Chronic | ICD-10-CM

## 2023-01-26 DIAGNOSIS — Z96.89 PRESENCE OF OTHER SPECIFIED FUNCTIONAL IMPLANTS: Chronic | ICD-10-CM

## 2023-01-26 DIAGNOSIS — Z98.49 CATARACT EXTRACTION STATUS, UNSPECIFIED EYE: Chronic | ICD-10-CM

## 2023-01-26 DIAGNOSIS — Z98.890 OTHER SPECIFIED POSTPROCEDURAL STATES: Chronic | ICD-10-CM

## 2023-01-26 DIAGNOSIS — Z00.8 ENCOUNTER FOR OTHER GENERAL EXAMINATION: ICD-10-CM

## 2023-01-26 PROCEDURE — 74178 CT ABD&PLV WO CNTR FLWD CNTR: CPT | Mod: MH

## 2023-01-26 PROCEDURE — 74178 CT ABD&PLV WO CNTR FLWD CNTR: CPT | Mod: 26,MH

## 2023-02-15 ENCOUNTER — APPOINTMENT (OUTPATIENT)
Dept: ORTHOPEDIC SURGERY | Facility: CLINIC | Age: 78
End: 2023-02-15
Payer: MEDICARE

## 2023-02-15 VITALS
DIASTOLIC BLOOD PRESSURE: 83 MMHG | OXYGEN SATURATION: 95 % | WEIGHT: 195 LBS | TEMPERATURE: 97.4 F | SYSTOLIC BLOOD PRESSURE: 149 MMHG | BODY MASS INDEX: 29.55 KG/M2 | HEIGHT: 68 IN | HEART RATE: 75 BPM

## 2023-02-15 PROCEDURE — 99214 OFFICE O/P EST MOD 30 MIN: CPT

## 2023-04-18 ENCOUNTER — APPOINTMENT (OUTPATIENT)
Dept: ORTHOPEDIC SURGERY | Facility: CLINIC | Age: 78
End: 2023-04-18

## 2023-04-24 NOTE — REVIEW OF SYSTEMS
[Joint Pain] : joint pain [Joint Stiffness] : joint stiffness [Negative] : Heme/Lymph [FreeTextEntry9] : Upper Back Pain/Neck Pain Detail Level: Detailed Instructions (Optional): A. Right inner thigh 0.4 r/o: psoriasis vs times vs lichen planus vs allergic contact dermatitis vs lupus vs coccidio vs mycosis fungoides vs other\\nB. Mid upper back 0.4 r/o: psoriasis vs times vs lichen planus vs allergic contact dermatitis vs lupus vs coccidio vs mycosis fungoides vs other\\nC.  Umbilical 0.4 r/o: psoriasis vs times vs lichen planus vs allergic contact dermatitis vs lupus vs coccidio vs mycosis fungoides vs other Introduction Text (Please End With A Colon): The following is to be performed next visit: Procedure To Be Performed At Next Visit: Biopsy by punch method Size Of Lesion In Cm (Optional): 0

## 2023-04-28 ENCOUNTER — APPOINTMENT (OUTPATIENT)
Dept: VASCULAR SURGERY | Facility: CLINIC | Age: 78
End: 2023-04-28
Payer: MEDICARE

## 2023-04-28 VITALS — TEMPERATURE: 97.8 F | BODY MASS INDEX: 30.16 KG/M2 | HEIGHT: 68 IN | WEIGHT: 199 LBS

## 2023-04-28 VITALS — DIASTOLIC BLOOD PRESSURE: 85 MMHG | SYSTOLIC BLOOD PRESSURE: 152 MMHG | HEART RATE: 61 BPM

## 2023-04-28 VITALS — DIASTOLIC BLOOD PRESSURE: 95 MMHG | SYSTOLIC BLOOD PRESSURE: 135 MMHG | HEART RATE: 65 BPM

## 2023-04-28 PROCEDURE — 99214 OFFICE O/P EST MOD 30 MIN: CPT

## 2023-04-28 PROCEDURE — 93978 VASCULAR STUDY: CPT

## 2023-04-28 NOTE — PHYSICAL EXAM
[1+] : left 1+ [2+] : left 2+ [Ankle Swelling (On Exam)] : present [Ankle Swelling Bilaterally] : bilaterally  [] : bilaterally [Ankle Swelling On The Right] : mild [No Rash or Lesion] : No rash or lesion [Alert] : alert [Oriented to Person] : oriented to person [Oriented to Place] : oriented to place [Oriented to Time] : oriented to time [Calm] : calm [Varicose Veins Of Lower Extremities] : not present [de-identified] : NAD [de-identified] : NC/AT [de-identified] : unlabored breathing [FreeTextEntry1] : abdominal incision well healed\par groin site soft, nontender and well healed\par bilateral lower extremities soft, warm and nontender\par bilateral ankle edema [de-identified] : incision well healed [de-identified] : JAYCEL [de-identified] : erik cranial nerves 2-12 erik grossly intact [de-identified] : cooperative

## 2023-04-28 NOTE — DATA REVIEWED
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

## 2023-04-28 NOTE — ASSESSMENT
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

## 2023-04-28 NOTE — HISTORY OF PRESENT ILLNESS
[FreeTextEntry1] : Pt presents for follow up to evaluate AAA and left internal iliac aneurysm. History of asymptomatic open AAA repair in 2005 and endovascular repair left internal iliac artery aneurysm with  embolization of left hypogastric artery and left iliac endograft placement in 5/2021. Also history of low back pain, lower extremity edema and venous insufficiency.\par \par Today, no new vascular-related complaints. Pt denies chest pain, abdominal pain, GI or lower extremity claudication, rest pain or tissue loss. Bilateral leg swelling has been the same for many years and he has remained asymptomatic. Mainly localized to the lower calves, ankles and feet. Pt states he walks a lot and is consistent with leg elevation. Does not wear compression stockings. He is taking low dose aspirin daily.\par \par CT abdomen/pelvis urogram reviewed from 1/26/2023\par - patent left external iliac artery stent\par \par CTA abdomen/pelvis reviewed from 7/2021\par - stable infrarenal AAA 2.6\par

## 2023-08-16 ENCOUNTER — APPOINTMENT (OUTPATIENT)
Dept: ORTHOPEDIC SURGERY | Facility: CLINIC | Age: 78
End: 2023-08-16
Payer: MEDICARE

## 2023-08-16 VITALS
DIASTOLIC BLOOD PRESSURE: 82 MMHG | HEART RATE: 80 BPM | HEIGHT: 68 IN | BODY MASS INDEX: 30.31 KG/M2 | SYSTOLIC BLOOD PRESSURE: 136 MMHG | WEIGHT: 200 LBS | OXYGEN SATURATION: 96 %

## 2023-08-16 PROCEDURE — 99214 OFFICE O/P EST MOD 30 MIN: CPT

## 2023-08-16 NOTE — ADDENDUM
[FreeTextEntry1] : This note was written by Lavell Flynn on 08/16/2023 acting as scribe for Dr. David Coyle M.D.  I, David Coyle MD, have read and attest that all the information, medical decision making and discharge instructions within are true and accurate.

## 2023-08-16 NOTE — PHYSICAL EXAM
[Normal] : Gait: normal [Pena's Sign] : negative Pena's sign [Pronator Drift] : negative pronator drift [SLR] : negative straight leg raise [de-identified] : 5 out of 5 motor strength, sensation is intact and symmetrical full range of motion flexion extension and rotation, no palpatory tenderness full range of motion of hips knees shoulders and elbows (all four extremities), no atrophy, negative straight leg raise, no pathological reflexes, no swelling, normal ambulation, no apparent distress skin is intact, no palpable lymph nodes, no upper or lower extremity instability, alert and oriented x3 and normal mood. Normal finger-to nose test.  No upper motor neuron findings. [de-identified] : I reviewed, interpreted and clinically correlated the following outside imaging studies, \par  MR SPINE CERVICAL - ORDERED BY: GODFREY ARRINGTON\par  \par  \par  PROCEDURE DATE: 03/15/2022\par  \par  \par  \par  INTERPRETATION: CLINICAL INDICATION:Right-sided neck pain for one year. Spinal cord stimulator.\par  \par  Multiplanar Multisequence MR of the CERVICAL SPINE without contrast\par  \par  Prior Studies: MRI of the cervical spine from April 17, 2017.\par  \par  FINDINGS:\par  \par  ALIGNMENT: There is straightening of the cervical lordosis. There is mild retrolisthesis of C5 on C6. There is trace degenerative grade 1 anterolisthesis of C4 on C5. Findings are grossly unchanged.\par  \par  VERTEBRAL BODIES: There is no vertebral body compression deformity.\par  \par  DISC SPACES: There is multilevel disc desiccation. There is moderate disc height loss at C3/C4 and C5/C6.\par  \par  MARROW: Prominent edematous end plate changes are seen at C3/C4. There is osseous edema about the right C3/C4 facet joint which is new compared to the prior examination and suggestive of osseous stress reaction.\par  \par  IMAGED BRAIN: Within normal limits.\par  \par  CERVICAL CORD: There is trace T2 prolongation within the cervical cord at the level of C3/C4 with mild associated cord atrophy. This is progressed compared to the prior examination and is likely related to myelomalacia given the prominent stenosis at this level.\par  \par  IMAGED NECK SOFT TISSUES: Trace right mastoid air cell effusion is noted.\par  \par  The findings at the individual levels are as follows:\par  \par  C1/2: There is moderate to severe arthrosis between the anterior arch of C1 and the dens, unchanged.\par  \par  C2/3: There is a minimal disc bulge with posterior osseous ridging which is asymmetric to the right. There is bilateral uncovertebral hypertrophy. There is right greater than left facet arthrosis. Findings result in flattening of the ventral thecal sac with mild contacting of the cord. There is severe right neural foraminal narrowing. Findings appear mildly progressed compared to the prior examination.\par  \par  C3/4: There is a diffuse disc bulge with posterior osseous ridging and bilateral uncovertebral hypertrophy. There is severe right and mild left facet arthrosis. There is near complete circumferential effacement of the CSF column. There is contacting and flattening of the cord both ventrally and dorsally. There is severe bilateral neural foraminal narrowing. These findings are progressed compared to the prior examination.\par  \par  C4/5: There is a mild diffuse disc bulge bilateral uncovertebral hypertrophy. There is bilateral facet arthrosis. Findings result in flattening of the ventral thecal sac without contacting of the cord. There is moderate bilateral neural foraminal narrowing. Findings grossly unchanged.\par  \par  C5/6: There is mild diffuse disc bulge with left greater than right uncovertebral hypertrophy. There is bilateral facet arthrosis. Findings results in flattening of the ventral thecal sac with mild effacement of the left lateral recess. There is mild contacting of the exiting left C6 nerve root. There is mild right neural foraminal narrowing. There is flattening of the ventral thecal sac without contacting the cord. Findings are slightly more prominent compared to the prior examination.\par  \par  C6/7: Small diffuse disc bulge posterior osseous ridging and bilateral uncovertebral hypertrophy. Findings result in flattening of ventral thecal sac without contacting the cord. There is mild bilateral neural foraminal narrowing. Findings grossly unchanged.\par  \par  C7/T1: There is no spinal canal or neural foraminal narrowing.\par  \par  IMPRESSION:\par  \par  Multilevel cervical spondylosis. Mild retrolisthesis of C5 on C6. Trace degenerative grade 1 anterolisthesis of C4 on C5. Osseous edema about the right C3/C4 facet joint which is new compared to the prior examination and suggestive of osseous stress reaction.\par  \par  Trace T2 prolongation within the cervical cord at the level of C3/C4 with mild associated cord atrophy. This is progressed compared to the prior examination and is likely related to myelomalacia given the prominent stenosis at this level.\par  \par  C2/3: There is flattening of the ventral thecal sac with mild contacting of the cord. There is severe right neural foraminal narrowing. Findings appear mildly progressed compared to the prior examination.\par  \par  C3/4: There is near complete circumferential effacement of the CSF column. There is contacting and flattening of the cord both ventrally and dorsally. There is severe bilateral neural foraminal narrowing. These findings are progressed compared to the prior examination.\par  \par  C5/6: There is flattening of the ventral thecal sac with mild effacement of the left lateral recess. There is mild contacting of the exiting left C6 nerve root. There is mild right neural foraminal narrowing. There is flattening of the ventral thecal sac without contacting the cord. Findings are grossly slightly more prominent compared to the prior examination.\par  \par  --- End of Report ---\par  \par  \par  \par

## 2023-08-16 NOTE — HISTORY OF PRESENT ILLNESS
[Stable] : stable [de-identified] : 77 year old male presents for follow up of cervical stenosis.  Denies weakness of arms and legs.  Has been taking hydrocodone and medical marijuana with minimal relief. Has participated with PT in 2021 and had felt improvement.  No issues with hands or gait. Denies history of CATHERINE, states he has done LESI in the past.  Patient states he's not dropping objects and has no issues with fine motor skills. Surgery was discussed at prior visits.  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 No blood thinners.   Prior MRI of the cervical spine has been reviewed. States he has been stable, no changes since last visit.  No fever chills sweats nausea vomiting no bowel or bladder dysfunction, no recent weight loss or gain no night pain. This history is in addition to the intake form that I personally reviewed.  Here with wife.

## 2023-08-16 NOTE — DISCUSSION/SUMMARY
[Surgical risks reviewed] : Surgical risks reviewed [de-identified] : Cervical stenosis. Cervical degenerative disc disease. No issues. Doing well. He is not showing any signs of cervical myelopathy today and feels quite well. Patients symptoms are stable, has not worsened since last visit- 6 months. Discussed all options.  Avoid cervical strain and sprain. Discussed surgery- patient will avoid it as of now.  Patient has not worsened and his symptoms are stable. If he develops cervical stenosis such as weakness  symptoms he should come and see me. F/U 6 months. All options discussed including rest, medicine, home exercise, acupuncture, Chiropractic care, Physical Therapy, Pain management, and last resort surgery. All questions were answered, all alternatives discussed, and the patient is in complete agreement with the treatment plan which the patient contributed to and discussed with me through the shared decision-making process. Follow-up appointment as instructed. Any issues and the patient will call or come in sooner.  His wife agrees with the plan. Wife agrees with the plan.

## 2023-11-22 NOTE — PATIENT PROFILE ADULT. - NSALCOHOLAMT_GEN_A_CORE_SD
1-2 drinks Pt BIBA from home c/o increasing SOB with bilateral leg swelling, h/o CHF on Lasix, O2 sat 88% on room improved with 4LPM oxygen to 100%, was given dexamethasone 10 mg IV by EMS,

## 2024-02-21 ENCOUNTER — APPOINTMENT (OUTPATIENT)
Dept: ORTHOPEDIC SURGERY | Facility: CLINIC | Age: 79
End: 2024-02-21
Payer: MEDICARE

## 2024-02-21 VITALS
BODY MASS INDEX: 30.31 KG/M2 | HEART RATE: 71 BPM | HEIGHT: 68 IN | SYSTOLIC BLOOD PRESSURE: 154 MMHG | DIASTOLIC BLOOD PRESSURE: 88 MMHG | WEIGHT: 200 LBS | OXYGEN SATURATION: 97 %

## 2024-02-21 DIAGNOSIS — M48.02 SPINAL STENOSIS, CERVICAL REGION: ICD-10-CM

## 2024-02-21 DIAGNOSIS — S22.009A UNSPECIFIED FRACTURE OF UNSPECIFIED THORACIC VERTEBRA, INITIAL ENCOUNTER FOR CLOSED FRACTURE: ICD-10-CM

## 2024-02-21 PROCEDURE — 99214 OFFICE O/P EST MOD 30 MIN: CPT

## 2024-02-21 NOTE — PHYSICAL EXAM
[Normal] : Gait: normal [Pena's Sign] : negative Pena's sign [Pronator Drift] : negative pronator drift [SLR] : negative straight leg raise [de-identified] : 5 out of 5 motor strength, sensation is intact and symmetrical full range of motion flexion extension and rotation, no palpatory tenderness full range of motion of hips knees shoulders and elbows (all four extremities), no atrophy, negative straight leg raise, no pathological reflexes, no swelling, normal ambulation, no apparent distress skin is intact, no palpable lymph nodes, no upper or lower extremity instability, alert and oriented x3 and normal mood. Normal finger-to nose test. No upper motor neuron findings.  [de-identified] :  I reviewed, interpreted and clinically correlated the following outside imaging studies, MR SPINE CERVICAL - ORDERED BY: GODFREY ARRINGTON   PROCEDURE DATE: 03/15/2022    INTERPRETATION: CLINICAL INDICATION:Right-sided neck pain for one year. Spinal cord stimulator.  Multiplanar Multisequence MR of the CERVICAL SPINE without contrast  Prior Studies: MRI of the cervical spine from April 17, 2017.  FINDINGS:  ALIGNMENT: There is straightening of the cervical lordosis. There is mild retrolisthesis of C5 on C6. There is trace degenerative grade 1 anterolisthesis of C4 on C5. Findings are grossly unchanged.  VERTEBRAL BODIES: There is no vertebral body compression deformity.  DISC SPACES: There is multilevel disc desiccation. There is moderate disc height loss at C3/C4 and C5/C6.  MARROW: Prominent edematous end plate changes are seen at C3/C4. There is osseous edema about the right C3/C4 facet joint which is new compared to the prior examination and suggestive of osseous stress reaction.  IMAGED BRAIN: Within normal limits.  CERVICAL CORD: There is trace T2 prolongation within the cervical cord at the level of C3/C4 with mild associated cord atrophy. This is progressed compared to the prior examination and is likely related to myelomalacia given the prominent stenosis at this level.  IMAGED NECK SOFT TISSUES: Trace right mastoid air cell effusion is noted.  The findings at the individual levels are as follows:  C1/2: There is moderate to severe arthrosis between the anterior arch of C1 and the dens, unchanged.  C2/3: There is a minimal disc bulge with posterior osseous ridging which is asymmetric to the right. There is bilateral uncovertebral hypertrophy. There is right greater than left facet arthrosis. Findings result in flattening of the ventral thecal sac with mild contacting of the cord. There is severe right neural foraminal narrowing. Findings appear mildly progressed compared to the prior examination.  C3/4: There is a diffuse disc bulge with posterior osseous ridging and bilateral uncovertebral hypertrophy. There is severe right and mild left facet arthrosis. There is near complete circumferential effacement of the CSF column. There is contacting and flattening of the cord both ventrally and dorsally. There is severe bilateral neural foraminal narrowing. These findings are progressed compared to the prior examination.  C4/5: There is a mild diffuse disc bulge bilateral uncovertebral hypertrophy. There is bilateral facet arthrosis. Findings result in flattening of the ventral thecal sac without contacting of the cord. There is moderate bilateral neural foraminal narrowing. Findings grossly unchanged.  C5/6: There is mild diffuse disc bulge with left greater than right uncovertebral hypertrophy. There is bilateral facet arthrosis. Findings results in flattening of the ventral thecal sac with mild effacement of the left lateral recess. There is mild contacting of the exiting left C6 nerve root. There is mild right neural foraminal narrowing. There is flattening of the ventral thecal sac without contacting the cord. Findings are slightly more prominent compared to the prior examination.  C6/7: Small diffuse disc bulge posterior osseous ridging and bilateral uncovertebral hypertrophy. Findings result in flattening of ventral thecal sac without contacting the cord. There is mild bilateral neural foraminal narrowing. Findings grossly unchanged.  C7/T1: There is no spinal canal or neural foraminal narrowing.  IMPRESSION:  Multilevel cervical spondylosis. Mild retrolisthesis of C5 on C6. Trace degenerative grade 1 anterolisthesis of C4 on C5. Osseous edema about the right C3/C4 facet joint which is new compared to the prior examination and suggestive of osseous stress reaction.  Trace T2 prolongation within the cervical cord at the level of C3/C4 with mild associated cord atrophy. This is progressed compared to the prior examination and is likely related to myelomalacia given the prominent stenosis at this level.  C2/3: There is flattening of the ventral thecal sac with mild contacting of the cord. There is severe right neural foraminal narrowing. Findings appear mildly progressed compared to the prior examination.  C3/4: There is near complete circumferential effacement of the CSF column. There is contacting and flattening of the cord both ventrally and dorsally. There is severe bilateral neural foraminal narrowing. These findings are progressed compared to the prior examination.  C5/6: There is flattening of the ventral thecal sac with mild effacement of the left lateral recess. There is mild contacting of the exiting left C6 nerve root. There is mild right neural foraminal narrowing. There is flattening of the ventral thecal sac without contacting the cord. Findings are grossly slightly more prominent compared to the prior examination.  --- End of Report ---.

## 2024-02-21 NOTE — HISTORY OF PRESENT ILLNESS
[de-identified] : Patient is a 78 year-old male presents for follow up of cervical stenosis. No improvement or worsening of symptoms from last visit on 08/16/2023. Denies weakness of arms and legs. No issues with hands or gait. Patient states he's not dropping objects and has no issues with fine motor skills. Has been performing Chiropractic, Pain Management and home exercises. Last participated with PT in 2021.  Denies history of CATHERINE, states he has done LESI in the past. No fever chills sweats nausea vomiting no bowel or bladder dysfunction, no recent weight loss or gain no night pain. This history is in addition to the intake form that I personally reviewed.  [Stable] : stable

## 2024-02-21 NOTE — DISCUSSION/SUMMARY
[Medication Risks Reviewed] : Medication risks reviewed [Surgical risks reviewed] : Surgical risks reviewed [de-identified] : Cervical stenosis. Cervical degenerative disc disease. No issues. Doing well. He is not showing any signs of cervical myelopathy today and feels quite well. Patient's symptoms are stable, has not worsened since last visit- 6 months. Discussed all options. Avoid cervical strain and sprain. Patient's symptoms are stable, surgery will be avoided as of now. If he develops any upper motor neuron symptoms or loss of function he should see me to discuss surgery. F/U 6 months. Discussed surgery- patient will avoid it as of now. Surgery will involve a posterior cervical decompression and fusion utilizing rods and screws used off label and explained to the patient, local autograft bone and spinal cord monitoring will be utilized. Dumont head corral. Risks of surgery include infection, dural tear, nerve root injury, spinal cord injury, adjacent level breakdown, future arm pain, future neck pain, difficulty swallowing, difficulty breathing, recurrent laryngeal nerve injury, esophageal in the injury, tracheal injury, hematoma, Dumont head corral risks. C5-6 nerve palsy, hardware failure, adjacent level surgery, anesthetic risk, positioning pain, blood transfusion risk, nonunion requiring additional surgery, deep vein thrombosis, pulmonary embolus, myocardial infarction and death. Somatosensory evoke potentials and motor evoked potentials and EMG monitoring will be used. Patient will need spinal orthosis pre and post operatively. All risks were explained not exclusive to the ones mentioned alternatives were discussed and all questions were answered the patient agrees and understands the above and is in complete agreement with the plan. All options discussed including rest, medicine, home exercise, acupuncture, Chiropractic care, Physical Therapy, Pain management, and last resort surgery. All questions were answered, all alternatives discussed, and the patient is in complete agreement with the treatment plan which the patient contributed to and discussed with me through the shared decision-making process. Follow-up appointment as instructed. Any issues and the patient will call or come in sooner. Wife agrees with plan.

## 2024-02-21 NOTE — ADDENDUM
[FreeTextEntry1] : This note was written by Lavell Flynn on 02/21/2024 acting as scribe for Dr. David Coyle M.D.  I, David Coyle MD, have read and attest that all the information, medical decision making and discharge instructions within are true and accurate.

## 2024-05-03 ENCOUNTER — APPOINTMENT (OUTPATIENT)
Dept: VASCULAR SURGERY | Facility: CLINIC | Age: 79
End: 2024-05-03
Payer: MEDICARE

## 2024-05-03 VITALS — SYSTOLIC BLOOD PRESSURE: 157 MMHG | DIASTOLIC BLOOD PRESSURE: 92 MMHG | HEART RATE: 74 BPM

## 2024-05-03 VITALS
TEMPERATURE: 97.8 F | HEIGHT: 68 IN | DIASTOLIC BLOOD PRESSURE: 90 MMHG | SYSTOLIC BLOOD PRESSURE: 169 MMHG | BODY MASS INDEX: 30.31 KG/M2 | HEART RATE: 70 BPM | WEIGHT: 200 LBS

## 2024-05-03 DIAGNOSIS — I87.2 VENOUS INSUFFICIENCY (CHRONIC) (PERIPHERAL): ICD-10-CM

## 2024-05-03 DIAGNOSIS — I71.40 ABDOMINAL AORTIC ANEURYSM, WITHOUT RUPTURE, UNSPECIFIED: ICD-10-CM

## 2024-05-03 DIAGNOSIS — I72.3 ANEURYSM OF ILIAC ARTERY: ICD-10-CM

## 2024-05-03 PROCEDURE — 93978 VASCULAR STUDY: CPT

## 2024-05-03 PROCEDURE — 99214 OFFICE O/P EST MOD 30 MIN: CPT

## 2024-05-03 NOTE — PHYSICAL EXAM
[1+] : left 1+ [2+] : left 2+ [Ankle Swelling (On Exam)] : present [Ankle Swelling Bilaterally] : bilaterally  [] : bilaterally [Ankle Swelling On The Right] : mild [No Rash or Lesion] : No rash or lesion [Alert] : alert [Oriented to Person] : oriented to person [Oriented to Place] : oriented to place [Oriented to Time] : oriented to time [Calm] : calm [Varicose Veins Of Lower Extremities] : not present [de-identified] : NAD [de-identified] : NCAT [de-identified] : unlabored breathing [FreeTextEntry1] : abdominal incision well healed groin site soft, nontender and well healed bilateral lower extremities soft, warm and nontender bilateral ankle edema no wounds or ulcers [de-identified] : incision well healed [de-identified] : JAYCEL [de-identified] : erik cranial nerves 2-12 erik grossly intact [de-identified] : cooperative

## 2024-05-03 NOTE — HISTORY OF PRESENT ILLNESS
[FreeTextEntry1] : Pt presents for follow up to evaluate AAA and left internal iliac aneurysm. History of aorta bi-iliac bypass secondary to AAA repair in 2005 and endovascular repair left internal iliac artery aneurysm with embolization of left hypogastric artery and left iliac endograft placement in 5/2021. Also history of low back pain, lower extremity edema and venous insufficiency.  Pt denies chest pain or abdominal pain. He has chronic back pain.  Denies GI or lower extremity claudication, rest pain, nonhealing wounds or ulcers. Bilateral leg swelling has been the same for many years and he has remained asymptomatic. Mainly localized to the lower calves, ankles and feet. Consistent with leg elevation. Does not wear compression stockings. He stopped taking asa couple months ago.  CT abdomen/pelvis urogram reviewed from 1/26/2023 - patent left external iliac artery stent  CTA abdomen/pelvis reviewed from 7/2021 - stable infrarenal AAA 2.6

## 2024-05-03 NOTE — DATA REVIEWED
[FreeTextEntry1] : 4/28/2023 - aortoiliac duplex patent open AAA repair, patent left external iliac stent stable infrarenal AAA 2.4 cm x 2.5 cm  stable left internal iliac artery anerusym 2.1 cm x 2.3 cm patent bilateral iliac arteries  5/3/2024 patent open AAA repair aorta 2.4 cm left internal iliac artery aneurysm embolized 2.2 x 2.2 cm

## 2024-05-03 NOTE — ASSESSMENT
[Arterial/Venous Disease] : arterial/venous disease [Medication Management] : medication management [Other: _____] : [unfilled] [FreeTextEntry1] : Impression - AAA s/p aorto bi-iliac bypass, left internal artery aneurysm s/p endovascular repair with coil embolization and stent placement, venous insufficiency with mild bilateral lower extremity swelling. Pt is stable   Plan Conservative medical management - leg elevation, exercise regimen, weight loss, diet control, 20-30 mm hg knee high compression stockings Restart asa 81 mg if okay with PCP Screen direct relatives for AAA Follow up in 1 year May 2025 with repeat aortoiliac duplex to evaluate AAA

## 2024-05-14 ENCOUNTER — NON-APPOINTMENT (OUTPATIENT)
Age: 79
End: 2024-05-14

## 2024-05-15 ENCOUNTER — APPOINTMENT (OUTPATIENT)
Dept: SPINE | Facility: CLINIC | Age: 79
End: 2024-05-15
Payer: MEDICARE

## 2024-05-15 ENCOUNTER — RESULT REVIEW (OUTPATIENT)
Age: 79
End: 2024-05-15

## 2024-05-15 VITALS
SYSTOLIC BLOOD PRESSURE: 157 MMHG | HEIGHT: 68 IN | OXYGEN SATURATION: 95 % | BODY MASS INDEX: 30.31 KG/M2 | WEIGHT: 200 LBS | DIASTOLIC BLOOD PRESSURE: 89 MMHG | HEART RATE: 76 BPM

## 2024-05-15 DIAGNOSIS — Z96.89 PRESENCE OF OTHER SPECIFIED FUNCTIONAL IMPLANTS: ICD-10-CM

## 2024-05-15 DIAGNOSIS — M54.50 LOW BACK PAIN, UNSPECIFIED: ICD-10-CM

## 2024-05-15 PROCEDURE — 99212 OFFICE O/P EST SF 10 MIN: CPT

## 2024-05-15 RX ORDER — NALOXEGOL OXALATE 12.5 MG/1
12.5 TABLET, FILM COATED ORAL
Refills: 0 | Status: DISCONTINUED | COMMUNITY
End: 2024-05-15

## 2024-05-15 RX ORDER — FUROSEMIDE 40 MG/1
40 TABLET ORAL
Qty: 30 | Refills: 0 | Status: DISCONTINUED | COMMUNITY
Start: 2020-12-26 | End: 2024-05-15

## 2024-05-15 RX ORDER — DIAZEPAM 2 MG/1
2 TABLET ORAL ONCE
Qty: 2 | Refills: 0 | Status: DISCONTINUED | COMMUNITY
Start: 2022-03-07 | End: 2024-05-15

## 2024-05-15 RX ORDER — METHYLPREDNISOLONE 4 MG/1
4 TABLET ORAL
Qty: 1 | Refills: 1 | Status: DISCONTINUED | COMMUNITY
Start: 2021-04-12 | End: 2024-05-15

## 2024-05-15 RX ORDER — CEPHALEXIN 500 MG/1
500 CAPSULE ORAL
Qty: 28 | Refills: 0 | Status: DISCONTINUED | COMMUNITY
Start: 2020-12-19 | End: 2024-05-15

## 2024-05-15 RX ORDER — ALPRAZOLAM 0.5 MG/1
0.5 TABLET ORAL
Qty: 2 | Refills: 0 | Status: DISCONTINUED | COMMUNITY
Start: 2021-03-15 | End: 2024-05-15

## 2024-05-15 RX ORDER — FUROSEMIDE 20 MG/1
20 TABLET ORAL
Refills: 0 | Status: DISCONTINUED | COMMUNITY
End: 2024-05-15

## 2024-05-16 ENCOUNTER — OUTPATIENT (OUTPATIENT)
Dept: OUTPATIENT SERVICES | Facility: HOSPITAL | Age: 79
LOS: 1 days | End: 2024-05-16
Payer: MEDICARE

## 2024-05-16 ENCOUNTER — APPOINTMENT (OUTPATIENT)
Dept: RADIOLOGY | Facility: CLINIC | Age: 79
End: 2024-05-16
Payer: MEDICARE

## 2024-05-16 DIAGNOSIS — Z98.890 OTHER SPECIFIED POSTPROCEDURAL STATES: Chronic | ICD-10-CM

## 2024-05-16 DIAGNOSIS — Z96.89 PRESENCE OF OTHER SPECIFIED FUNCTIONAL IMPLANTS: ICD-10-CM

## 2024-05-16 DIAGNOSIS — Z96.89 PRESENCE OF OTHER SPECIFIED FUNCTIONAL IMPLANTS: Chronic | ICD-10-CM

## 2024-05-16 DIAGNOSIS — Z90.79 ACQUIRED ABSENCE OF OTHER GENITAL ORGAN(S): Chronic | ICD-10-CM

## 2024-05-16 DIAGNOSIS — Z98.49 CATARACT EXTRACTION STATUS, UNSPECIFIED EYE: Chronic | ICD-10-CM

## 2024-05-16 PROCEDURE — 72082 X-RAY EXAM ENTIRE SPI 2/3 VW: CPT

## 2024-05-16 PROCEDURE — 72082 X-RAY EXAM ENTIRE SPI 2/3 VW: CPT | Mod: 26

## 2024-06-11 ENCOUNTER — OUTPATIENT (OUTPATIENT)
Dept: OUTPATIENT SERVICES | Facility: HOSPITAL | Age: 79
LOS: 1 days | End: 2024-06-11
Payer: MEDICARE

## 2024-06-11 VITALS
HEIGHT: 68 IN | RESPIRATION RATE: 20 BRPM | WEIGHT: 205.03 LBS | OXYGEN SATURATION: 95 % | SYSTOLIC BLOOD PRESSURE: 132 MMHG | TEMPERATURE: 98 F | DIASTOLIC BLOOD PRESSURE: 83 MMHG | HEART RATE: 87 BPM

## 2024-06-11 DIAGNOSIS — Z96.89 PRESENCE OF OTHER SPECIFIED FUNCTIONAL IMPLANTS: Chronic | ICD-10-CM

## 2024-06-11 DIAGNOSIS — Z98.49 CATARACT EXTRACTION STATUS, UNSPECIFIED EYE: Chronic | ICD-10-CM

## 2024-06-11 DIAGNOSIS — Z98.890 OTHER SPECIFIED POSTPROCEDURAL STATES: Chronic | ICD-10-CM

## 2024-06-11 DIAGNOSIS — Z45.42 ENCOUNTER FOR ADJUSTMENT AND MANAGEMENT OF NEUROSTIMULATOR: ICD-10-CM

## 2024-06-11 DIAGNOSIS — G47.33 OBSTRUCTIVE SLEEP APNEA (ADULT) (PEDIATRIC): ICD-10-CM

## 2024-06-11 DIAGNOSIS — Z90.79 ACQUIRED ABSENCE OF OTHER GENITAL ORGAN(S): Chronic | ICD-10-CM

## 2024-06-11 DIAGNOSIS — Z96.89 PRESENCE OF OTHER SPECIFIED FUNCTIONAL IMPLANTS: ICD-10-CM

## 2024-06-11 DIAGNOSIS — M54.50 LOW BACK PAIN, UNSPECIFIED: ICD-10-CM

## 2024-06-11 LAB
ANION GAP SERPL CALC-SCNC: 17 MMOL/L — SIGNIFICANT CHANGE UP (ref 5–17)
BUN SERPL-MCNC: 16 MG/DL — SIGNIFICANT CHANGE UP (ref 7–23)
CALCIUM SERPL-MCNC: 9.1 MG/DL — SIGNIFICANT CHANGE UP (ref 8.4–10.5)
CHLORIDE SERPL-SCNC: 105 MMOL/L — SIGNIFICANT CHANGE UP (ref 96–108)
CO2 SERPL-SCNC: 23 MMOL/L — SIGNIFICANT CHANGE UP (ref 22–31)
CREAT SERPL-MCNC: 0.98 MG/DL — SIGNIFICANT CHANGE UP (ref 0.5–1.3)
EGFR: 79 ML/MIN/1.73M2 — SIGNIFICANT CHANGE UP
GLUCOSE SERPL-MCNC: 91 MG/DL — SIGNIFICANT CHANGE UP (ref 70–99)
HCT VFR BLD CALC: 47.6 % — SIGNIFICANT CHANGE UP (ref 39–50)
HGB BLD-MCNC: 16.2 G/DL — SIGNIFICANT CHANGE UP (ref 13–17)
MCHC RBC-ENTMCNC: 30.1 PG — SIGNIFICANT CHANGE UP (ref 27–34)
MCHC RBC-ENTMCNC: 34 GM/DL — SIGNIFICANT CHANGE UP (ref 32–36)
MCV RBC AUTO: 88.3 FL — SIGNIFICANT CHANGE UP (ref 80–100)
MRSA PCR RESULT.: SIGNIFICANT CHANGE UP
NRBC # BLD: 0 /100 WBCS — SIGNIFICANT CHANGE UP (ref 0–0)
PLATELET # BLD AUTO: 216 K/UL — SIGNIFICANT CHANGE UP (ref 150–400)
POTASSIUM SERPL-MCNC: 3.4 MMOL/L — LOW (ref 3.5–5.3)
POTASSIUM SERPL-SCNC: 3.4 MMOL/L — LOW (ref 3.5–5.3)
RBC # BLD: 5.39 M/UL — SIGNIFICANT CHANGE UP (ref 4.2–5.8)
RBC # FLD: 12.5 % — SIGNIFICANT CHANGE UP (ref 10.3–14.5)
S AUREUS DNA NOSE QL NAA+PROBE: DETECTED
SODIUM SERPL-SCNC: 145 MMOL/L — SIGNIFICANT CHANGE UP (ref 135–145)
WBC # BLD: 5.77 K/UL — SIGNIFICANT CHANGE UP (ref 3.8–10.5)
WBC # FLD AUTO: 5.77 K/UL — SIGNIFICANT CHANGE UP (ref 3.8–10.5)

## 2024-06-11 PROCEDURE — 85027 COMPLETE CBC AUTOMATED: CPT

## 2024-06-11 PROCEDURE — G0463: CPT

## 2024-06-11 PROCEDURE — 87640 STAPH A DNA AMP PROBE: CPT

## 2024-06-11 PROCEDURE — 87641 MR-STAPH DNA AMP PROBE: CPT

## 2024-06-11 PROCEDURE — 80048 BASIC METABOLIC PNL TOTAL CA: CPT

## 2024-06-11 RX ORDER — TIZANIDINE 4 MG/1
1 TABLET ORAL
Qty: 0 | Refills: 0 | DISCHARGE

## 2024-06-11 RX ORDER — UBIDECARENONE 100 MG
1 CAPSULE ORAL
Qty: 0 | Refills: 0 | DISCHARGE

## 2024-06-11 RX ORDER — POTASSIUM CHLORIDE 20 MEQ
1 PACKET (EA) ORAL
Qty: 0 | Refills: 0 | DISCHARGE

## 2024-06-11 RX ORDER — DEXTROSE MONOHYDRATE AND SODIUM CHLORIDE 5; .3 G/100ML; G/100ML
1000 INJECTION, SOLUTION INTRAVENOUS
Refills: 0 | Status: DISCONTINUED | OUTPATIENT
Start: 2024-06-20 | End: 2024-07-04

## 2024-06-11 RX ORDER — LIDOCAINE HYDROCHLORIDE 20 MG/ML
0.2 INJECTION, SOLUTION EPIDURAL; INFILTRATION; INTRACAUDAL; PERINEURAL ONCE
Refills: 0 | Status: DISCONTINUED | OUTPATIENT
Start: 2024-06-20 | End: 2024-07-04

## 2024-06-11 NOTE — H&P PST ADULT - HISTORY OF PRESENT ILLNESS
78 year old male with h/o chronic back pain s/p spinal cord stimulator placed x 7 years ago, presents to PST for scheduled replace stimulator battery on 6/20/2024. His medical history significant for AAA and left internal iliac aneurysm repaired  78 year old male with h/o chronic back pain s/p spinal cord stimulator placed 2017, presents to PST for scheduled replace stimulator battery on 6/20/2024. His medical history significant for AAA (2007) and left internal iliac aneurysm repaired (2021), hypertension, hyperlipidemia, chronic lower back pain, venous insufficiency with lower extremities edema, arthritis, NATHAN (CPAP), BPH s/p TURP. 78 year old male with h/o chronic back pain s/p spinal cord stimulator placed 2017, presents to PST for scheduled replace stimulator battery on 6/20/2024. His medical history significant for AAA (2007) and left internal iliac aneurysm repaired (2021), hypertension, hyperlipidemia, chronic lower back pain, venous insufficiency with lower extremities edema, arthritis, NATHAN (CPAP), BPH s/p TURP.      Addeneum: Noted to have + MSSA on Preop labs. Surgeon and Neurosurgery team notified via email. LOGAN Carter 6/12/24

## 2024-06-11 NOTE — H&P PST ADULT - PROBLEM SELECTOR PLAN 1
Scheduled to replace spinal cord stimulator battery  preop instruction provided and pt verbalized understanding   pt to be evaluated by his pcp prior to surgery

## 2024-06-11 NOTE — H&P PST ADULT - NSICDXPASTMEDICALHX_GEN_ALL_CORE_FT
PAST MEDICAL HISTORY:  Chronic back pain     Folliculitis     History of DVT (deep vein thrombosis) s/p AAA repair 2007    HTN (Hypertension)     Hyperlipidemia     Lumbar disc disease     NATHAN on CPAP     Spinal stenosis      PAST MEDICAL HISTORY:  Battery end of life of spinal cord stimulator     Chronic back pain     Folliculitis     History of DVT (deep vein thrombosis) s/p AAA repair 2007    HTN (Hypertension)     Hyperlipidemia     Lumbar disc disease     NATHAN on CPAP     Spinal stenosis

## 2024-06-11 NOTE — H&P PST ADULT - REASON FOR ADMISSION
"I am having my battery replace for my Metronic device"   Goal: " to replace the battery so I don't have pain."

## 2024-06-11 NOTE — H&P PST ADULT - NS SC CAGE ALCOHOL GUILTY ABOUT
12/31/19 Patient: Jose Azul YOB: 1953 Date of Visit: 12/30/2019 Genevieve Haddad MD 
[address] [address] VIA In Basket Dear Genevieve Haddad MD, Thank you for referring Ms. Ceci Pathak to [facility] for evaluation. My notes for this consultation are attached. If you have questions, please do not hesitate to call me. I look forward to following your patient along with you. Sincerely, Negra Iglesias MD 
 

no

## 2024-06-11 NOTE — H&P PST ADULT - OTHER CARE PROVIDERS
Vascular: Dr. Todd Benson or [facility] # [phone]                Pain management: Dr. Royal Daigle # [phone]

## 2024-06-11 NOTE — H&P PST ADULT - NSICDXPROCEDURE_GEN_ALL_CORE_FT
PROCEDURES:  Replacement, spinal cord stimulator generator 11-Jun-2024 15:08:06 low back pain Uriah Reece

## 2024-06-11 NOTE — H&P PST ADULT - ASSESSMENT
Dasi activities: goes to the [facility] 4-5 days/ week (uses the treadmill for 20 mins, stationary bike, legs exercise), walking     Pt with no loose/broken tooth, 2 front top dental implant, some missing teeth  Dasi activities: goes to the GYM 4-5 days/ week (uses the treadmill for 20 mins, stationary bike, legs exercise), walking   Dasi score: 5.59  Pt with no loose/broken tooth, 2 front top dental implant, some missing teeth

## 2024-06-12 DIAGNOSIS — A49.01 METHICILLIN SUSCEPTIBLE STAPHYLOCOCCUS AUREUS INFECTION, UNSPECIFIED SITE: ICD-10-CM

## 2024-06-12 RX ORDER — MUPIROCIN 20 MG/G
2 OINTMENT TOPICAL
Qty: 1 | Refills: 0 | Status: ACTIVE | COMMUNITY
Start: 2024-06-12 | End: 1900-01-01

## 2024-06-19 ENCOUNTER — TRANSCRIPTION ENCOUNTER (OUTPATIENT)
Age: 79
End: 2024-06-19

## 2024-06-20 ENCOUNTER — APPOINTMENT (OUTPATIENT)
Dept: SPINE | Facility: HOSPITAL | Age: 79
End: 2024-06-20

## 2024-06-20 ENCOUNTER — OUTPATIENT (OUTPATIENT)
Dept: OUTPATIENT SERVICES | Facility: HOSPITAL | Age: 79
LOS: 1 days | End: 2024-06-20
Payer: MEDICARE

## 2024-06-20 ENCOUNTER — TRANSCRIPTION ENCOUNTER (OUTPATIENT)
Age: 79
End: 2024-06-20

## 2024-06-20 VITALS
OXYGEN SATURATION: 95 % | HEART RATE: 72 BPM | DIASTOLIC BLOOD PRESSURE: 88 MMHG | SYSTOLIC BLOOD PRESSURE: 141 MMHG | RESPIRATION RATE: 16 BRPM | TEMPERATURE: 97 F

## 2024-06-20 VITALS
TEMPERATURE: 98 F | WEIGHT: 205.03 LBS | OXYGEN SATURATION: 95 % | SYSTOLIC BLOOD PRESSURE: 138 MMHG | DIASTOLIC BLOOD PRESSURE: 88 MMHG | RESPIRATION RATE: 16 BRPM | HEART RATE: 88 BPM | HEIGHT: 68 IN

## 2024-06-20 DIAGNOSIS — Z96.89 PRESENCE OF OTHER SPECIFIED FUNCTIONAL IMPLANTS: Chronic | ICD-10-CM

## 2024-06-20 DIAGNOSIS — Z98.49 CATARACT EXTRACTION STATUS, UNSPECIFIED EYE: Chronic | ICD-10-CM

## 2024-06-20 DIAGNOSIS — Z98.890 OTHER SPECIFIED POSTPROCEDURAL STATES: Chronic | ICD-10-CM

## 2024-06-20 DIAGNOSIS — Z90.79 ACQUIRED ABSENCE OF OTHER GENITAL ORGAN(S): Chronic | ICD-10-CM

## 2024-06-20 DIAGNOSIS — M54.50 LOW BACK PAIN, UNSPECIFIED: ICD-10-CM

## 2024-06-20 DIAGNOSIS — Z96.89 PRESENCE OF OTHER SPECIFIED FUNCTIONAL IMPLANTS: ICD-10-CM

## 2024-06-20 PROBLEM — Z45.42 ENCOUNTER FOR ADJUSTMENT AND MANAGEMENT OF NEUROSTIMULATOR: Chronic | Status: ACTIVE | Noted: 2024-06-11

## 2024-06-20 LAB — GLUCOSE BLDC GLUCOMTR-MCNC: 107 MG/DL — HIGH (ref 70–99)

## 2024-06-20 PROCEDURE — 82962 GLUCOSE BLOOD TEST: CPT

## 2024-06-20 PROCEDURE — 63685 INS/RPLC SPI NPG/RCVR POCKET: CPT

## 2024-06-20 PROCEDURE — 76000 FLUOROSCOPY <1 HR PHYS/QHP: CPT

## 2024-06-20 PROCEDURE — C1787: CPT

## 2024-06-20 PROCEDURE — 95972 ALYS CPLX SP/PN NPGT W/PRGRM: CPT | Mod: 59

## 2024-06-20 PROCEDURE — C9399: CPT

## 2024-06-20 PROCEDURE — C1820: CPT

## 2024-06-20 PROCEDURE — 63685 INS/RPLC SPI NPG/RCVR POCKET: CPT | Mod: RT

## 2024-06-20 DEVICE — STIM ADAPTIVE RESTORE II INTELLIS SURESCAN MRI: Type: IMPLANTABLE DEVICE | Status: FUNCTIONAL

## 2024-06-20 DEVICE — RECHARGER INTELLIS: Type: IMPLANTABLE DEVICE | Status: FUNCTIONAL

## 2024-06-20 DEVICE — PROGRAMMER PT CONTROLLER NONTRIAL: Type: IMPLANTABLE DEVICE | Status: FUNCTIONAL

## 2024-06-20 RX ORDER — HYDROCODONE BITARTRATE 50 MG/1
1 CAPSULE, EXTENDED RELEASE ORAL
Qty: 0 | Refills: 0 | DISCHARGE

## 2024-06-20 RX ORDER — FUROSEMIDE 40 MG
1 TABLET ORAL
Qty: 0 | Refills: 0 | DISCHARGE

## 2024-06-20 RX ORDER — OXYCODONE AND ACETAMINOPHEN 5; 325 MG/1; MG/1
1 TABLET ORAL
Qty: 12 | Refills: 0
Start: 2024-06-20 | End: 2024-06-21

## 2024-06-20 RX ORDER — POTASSIUM CHLORIDE 20 MEQ
1 PACKET (EA) ORAL
Refills: 0 | DISCHARGE

## 2024-06-20 RX ORDER — HYDROMORPHONE HCL 0.2 MG/ML
0.5 INJECTION, SOLUTION INTRAVENOUS
Refills: 0 | Status: DISCONTINUED | OUTPATIENT
Start: 2024-06-20 | End: 2024-06-20

## 2024-06-20 RX ORDER — SIMVASTATIN 20 MG/1
1 TABLET, FILM COATED ORAL
Qty: 0 | Refills: 0 | DISCHARGE

## 2024-06-20 RX ORDER — CEFAZOLIN 10 G/1
2000 INJECTION, POWDER, FOR SOLUTION INTRAVENOUS ONCE
Refills: 0 | Status: COMPLETED | OUTPATIENT
Start: 2024-06-20 | End: 2024-06-20

## 2024-06-20 RX ORDER — FELODIPINE 5 MG/1
2 TABLET, FILM COATED, EXTENDED RELEASE ORAL
Qty: 0 | Refills: 0 | DISCHARGE

## 2024-06-20 RX ORDER — CEPHALEXIN 500 MG
1 CAPSULE ORAL
Qty: 14 | Refills: 0
Start: 2024-06-20 | End: 2024-06-26

## 2024-06-20 RX ORDER — ASPIRIN/CALCIUM CARB/MAGNESIUM 324 MG
0 TABLET ORAL
Qty: 0 | Refills: 0 | DISCHARGE

## 2024-06-20 RX ORDER — GABAPENTIN 400 MG/1
1 CAPSULE ORAL
Qty: 0 | Refills: 0 | DISCHARGE

## 2024-06-20 RX ADMIN — DEXTROSE MONOHYDRATE AND SODIUM CHLORIDE 100 MILLILITER(S): 5; .3 INJECTION, SOLUTION INTRAVENOUS at 09:27

## 2024-06-20 RX ADMIN — DEXTROSE MONOHYDRATE AND SODIUM CHLORIDE 100 MILLILITER(S): 5; .3 INJECTION, SOLUTION INTRAVENOUS at 09:16

## 2024-06-27 ENCOUNTER — APPOINTMENT (OUTPATIENT)
Dept: SPINE | Facility: CLINIC | Age: 79
End: 2024-06-27
Payer: MEDICARE

## 2024-06-27 ENCOUNTER — NON-APPOINTMENT (OUTPATIENT)
Age: 79
End: 2024-06-27

## 2024-06-27 VITALS
OXYGEN SATURATION: 89 % | DIASTOLIC BLOOD PRESSURE: 88 MMHG | WEIGHT: 200 LBS | HEART RATE: 86 BPM | BODY MASS INDEX: 30.31 KG/M2 | HEIGHT: 68 IN | SYSTOLIC BLOOD PRESSURE: 156 MMHG

## 2024-06-27 PROCEDURE — 95972 ALYS CPLX SP/PN NPGT W/PRGRM: CPT

## 2024-06-27 PROCEDURE — 99024 POSTOP FOLLOW-UP VISIT: CPT

## 2024-06-28 ENCOUNTER — NON-APPOINTMENT (OUTPATIENT)
Age: 79
End: 2024-06-28

## 2024-07-02 ENCOUNTER — APPOINTMENT (OUTPATIENT)
Dept: SPINE | Facility: CLINIC | Age: 79
End: 2024-07-02
Payer: MEDICARE

## 2024-07-02 DIAGNOSIS — M54.9 DORSALGIA, UNSPECIFIED: ICD-10-CM

## 2024-07-02 DIAGNOSIS — G89.29 DORSALGIA, UNSPECIFIED: ICD-10-CM

## 2024-07-02 PROCEDURE — 99441: CPT | Mod: 93

## 2024-08-21 ENCOUNTER — APPOINTMENT (OUTPATIENT)
Dept: ORTHOPEDIC SURGERY | Facility: CLINIC | Age: 79
End: 2024-08-21
Payer: MEDICARE

## 2024-08-21 VITALS
DIASTOLIC BLOOD PRESSURE: 84 MMHG | HEART RATE: 66 BPM | OXYGEN SATURATION: 90 % | WEIGHT: 204 LBS | SYSTOLIC BLOOD PRESSURE: 171 MMHG | BODY MASS INDEX: 30.92 KG/M2 | HEIGHT: 68 IN

## 2024-08-21 DIAGNOSIS — M48.02 SPINAL STENOSIS, CERVICAL REGION: ICD-10-CM

## 2024-08-21 PROCEDURE — 99214 OFFICE O/P EST MOD 30 MIN: CPT

## 2024-08-21 NOTE — ADDENDUM
[FreeTextEntry1] : This note was written by Lavell Flynn on 08/21/2024 acting as scribe for Dr. David Coyle M.D.  I, David Coyle MD, have read and attest that all the information, medical decision making and discharge instructions within are true and accurate.
FAMILY HISTORY:  FH: breast cancer, Aunt

## 2024-08-21 NOTE — PHYSICAL EXAM
[Normal] : Gait: normal [Pena's Sign] : negative Pena's sign [Pronator Drift] : negative pronator drift [SLR] : negative straight leg raise [de-identified] : 5 out of 5 motor strength, sensation is intact and symmetrical full range of motion flexion extension and rotation, no palpatory tenderness full range of motion of hips knees shoulders and elbows (all four extremities), no atrophy, negative straight leg raise, no pathological reflexes, no swelling, normal ambulation, no apparent distress skin is intact, no palpable lymph nodes, no upper or lower extremity instability, alert and oriented x3 and normal mood. Normal finger-to nose test. No upper motor neuron findings.  [de-identified] :  I reviewed, interpreted and clinically correlated the following outside imaging studies, MR SPINE CERVICAL - ORDERED BY: GODFREY ARRINGTON   PROCEDURE DATE: 03/15/2022    INTERPRETATION: CLINICAL INDICATION:Right-sided neck pain for one year. Spinal cord stimulator.  Multiplanar Multisequence MR of the CERVICAL SPINE without contrast  Prior Studies: MRI of the cervical spine from April 17, 2017.  FINDINGS:  ALIGNMENT: There is straightening of the cervical lordosis. There is mild retrolisthesis of C5 on C6. There is trace degenerative grade 1 anterolisthesis of C4 on C5. Findings are grossly unchanged.  VERTEBRAL BODIES: There is no vertebral body compression deformity.  DISC SPACES: There is multilevel disc desiccation. There is moderate disc height loss at C3/C4 and C5/C6.  MARROW: Prominent edematous end plate changes are seen at C3/C4. There is osseous edema about the right C3/C4 facet joint which is new compared to the prior examination and suggestive of osseous stress reaction.  IMAGED BRAIN: Within normal limits.  CERVICAL CORD: There is trace T2 prolongation within the cervical cord at the level of C3/C4 with mild associated cord atrophy. This is progressed compared to the prior examination and is likely related to myelomalacia given the prominent stenosis at this level.  IMAGED NECK SOFT TISSUES: Trace right mastoid air cell effusion is noted.  The findings at the individual levels are as follows:  C1/2: There is moderate to severe arthrosis between the anterior arch of C1 and the dens, unchanged.  C2/3: There is a minimal disc bulge with posterior osseous ridging which is asymmetric to the right. There is bilateral uncovertebral hypertrophy. There is right greater than left facet arthrosis. Findings result in flattening of the ventral thecal sac with mild contacting of the cord. There is severe right neural foraminal narrowing. Findings appear mildly progressed compared to the prior examination.  C3/4: There is a diffuse disc bulge with posterior osseous ridging and bilateral uncovertebral hypertrophy. There is severe right and mild left facet arthrosis. There is near complete circumferential effacement of the CSF column. There is contacting and flattening of the cord both ventrally and dorsally. There is severe bilateral neural foraminal narrowing. These findings are progressed compared to the prior examination.  C4/5: There is a mild diffuse disc bulge bilateral uncovertebral hypertrophy. There is bilateral facet arthrosis. Findings result in flattening of the ventral thecal sac without contacting of the cord. There is moderate bilateral neural foraminal narrowing. Findings grossly unchanged.  C5/6: There is mild diffuse disc bulge with left greater than right uncovertebral hypertrophy. There is bilateral facet arthrosis. Findings results in flattening of the ventral thecal sac with mild effacement of the left lateral recess. There is mild contacting of the exiting left C6 nerve root. There is mild right neural foraminal narrowing. There is flattening of the ventral thecal sac without contacting the cord. Findings are slightly more prominent compared to the prior examination.  C6/7: Small diffuse disc bulge posterior osseous ridging and bilateral uncovertebral hypertrophy. Findings result in flattening of ventral thecal sac without contacting the cord. There is mild bilateral neural foraminal narrowing. Findings grossly unchanged.  C7/T1: There is no spinal canal or neural foraminal narrowing.  IMPRESSION:  Multilevel cervical spondylosis. Mild retrolisthesis of C5 on C6. Trace degenerative grade 1 anterolisthesis of C4 on C5. Osseous edema about the right C3/C4 facet joint which is new compared to the prior examination and suggestive of osseous stress reaction.  Trace T2 prolongation within the cervical cord at the level of C3/C4 with mild associated cord atrophy. This is progressed compared to the prior examination and is likely related to myelomalacia given the prominent stenosis at this level.  C2/3: There is flattening of the ventral thecal sac with mild contacting of the cord. There is severe right neural foraminal narrowing. Findings appear mildly progressed compared to the prior examination.  C3/4: There is near complete circumferential effacement of the CSF column. There is contacting and flattening of the cord both ventrally and dorsally. There is severe bilateral neural foraminal narrowing. These findings are progressed compared to the prior examination.  C5/6: There is flattening of the ventral thecal sac with mild effacement of the left lateral recess. There is mild contacting of the exiting left C6 nerve root. There is mild right neural foraminal narrowing. There is flattening of the ventral thecal sac without contacting the cord. Findings are grossly slightly more prominent compared to the prior examination.  --- End of Report ---.

## 2024-08-21 NOTE — DISCUSSION/SUMMARY
[Medication Risks Reviewed] : Medication risks reviewed [Surgical risks reviewed] : Surgical risks reviewed [de-identified] : Cervical stenosis. Cervical degenerative disc disease. No issues. Doing well. He is not showing any signs of cervical myelopathy today and feels quite well. Patient's symptoms are stable, has not worsened since last visit. Discussed all options. Avoid cervical strain and sprain. Patient's symptoms are stable, surgery will be avoided as of now. If he develops any upper motor neuron symptoms or loss of function he should see me to discuss surgery. F/U 6 months. Discussed surgery- patient will avoid it as of now. Surgery will involve a posterior cervical decompression and fusion utilizing rods and screws used off label and explained to the patient, local autograft bone and spinal cord monitoring will be utilized. [place] head corral. Risks of surgery include infection, dural tear, nerve root injury, spinal cord injury, adjacent level breakdown, future arm pain, future neck pain, difficulty swallowing, difficulty breathing, recurrent laryngeal nerve injury, esophageal in the injury, tracheal injury, hematoma, Dumont head corral risks. C5-6 nerve palsy, hardware failure, adjacent level surgery, anesthetic risk, positioning pain, blood transfusion risk, nonunion requiring additional surgery, deep vein thrombosis, pulmonary embolus, myocardial infarction and death. Somatosensory evoke potentials and motor evoked potentials and EMG monitoring will be used. Patient will need spinal orthosis pre and post operatively. All risks were explained not exclusive to the ones mentioned alternatives were discussed and all questions were answered the patient agrees and understands the above and is in complete agreement with the plan. All options discussed including rest, medicine, home exercise, acupuncture, Chiropractic care, Physical Therapy, Pain management, and last resort surgery. All questions were answered, all alternatives discussed, and the patient is in complete agreement with the treatment plan which the patient contributed to and discussed with me through the shared decision-making process. Follow-up appointment as instructed. Any issues and the patient will call or come in sooner. Wife agrees with plan.

## 2024-08-21 NOTE — HISTORY OF PRESENT ILLNESS
[Stable] : stable [de-identified] : Patient is a 79 year-old male presents for follow up of cervical stenosis. No improvement or worsening of symptoms from last visit on 08/16/2023. Denies weakness of arms and legs. No issues with hands or gait. Patient states he's not dropping objects and has no issues with fine motor skills. He does note that he has issues with writing for some time now. Has been performing Chiropractic, Pain Management and home exercises. Last participated with PT in 2021.  Denies history of CATHERINE, states he has done LESI in the past. No fever chills sweats nausea vomiting no bowel or bladder dysfunction, no recent weight loss or gain no night pain. This history is in addition to the intake form that I personally reviewed.

## 2024-10-08 ENCOUNTER — APPOINTMENT (OUTPATIENT)
Dept: RADIOLOGY | Facility: CLINIC | Age: 79
End: 2024-10-08
Payer: MEDICARE

## 2024-10-08 ENCOUNTER — OUTPATIENT (OUTPATIENT)
Dept: OUTPATIENT SERVICES | Facility: HOSPITAL | Age: 79
LOS: 1 days | End: 2024-10-08
Payer: MEDICARE

## 2024-10-08 DIAGNOSIS — Z98.890 OTHER SPECIFIED POSTPROCEDURAL STATES: Chronic | ICD-10-CM

## 2024-10-08 DIAGNOSIS — Z96.89 PRESENCE OF OTHER SPECIFIED FUNCTIONAL IMPLANTS: Chronic | ICD-10-CM

## 2024-10-08 DIAGNOSIS — Z98.49 CATARACT EXTRACTION STATUS, UNSPECIFIED EYE: Chronic | ICD-10-CM

## 2024-10-08 DIAGNOSIS — Z00.8 ENCOUNTER FOR OTHER GENERAL EXAMINATION: ICD-10-CM

## 2024-10-08 DIAGNOSIS — Z90.79 ACQUIRED ABSENCE OF OTHER GENITAL ORGAN(S): Chronic | ICD-10-CM

## 2024-10-08 PROCEDURE — 73560 X-RAY EXAM OF KNEE 1 OR 2: CPT | Mod: 26,50

## 2024-11-20 PROCEDURE — 73560 X-RAY EXAM OF KNEE 1 OR 2: CPT

## 2024-11-23 ENCOUNTER — APPOINTMENT (OUTPATIENT)
Dept: MRI IMAGING | Facility: CLINIC | Age: 79
End: 2024-11-23

## 2024-11-25 ENCOUNTER — RESULT REVIEW (OUTPATIENT)
Age: 79
End: 2024-11-25

## 2024-11-25 ENCOUNTER — OUTPATIENT (OUTPATIENT)
Dept: OUTPATIENT SERVICES | Facility: HOSPITAL | Age: 79
LOS: 1 days | End: 2024-11-25
Payer: MEDICARE

## 2024-11-25 ENCOUNTER — APPOINTMENT (OUTPATIENT)
Dept: MRI IMAGING | Facility: CLINIC | Age: 79
End: 2024-11-25
Payer: MEDICARE

## 2024-11-25 DIAGNOSIS — Z98.890 OTHER SPECIFIED POSTPROCEDURAL STATES: Chronic | ICD-10-CM

## 2024-11-25 DIAGNOSIS — Z00.8 ENCOUNTER FOR OTHER GENERAL EXAMINATION: ICD-10-CM

## 2024-11-25 DIAGNOSIS — Z98.49 CATARACT EXTRACTION STATUS, UNSPECIFIED EYE: Chronic | ICD-10-CM

## 2024-11-25 DIAGNOSIS — Z90.79 ACQUIRED ABSENCE OF OTHER GENITAL ORGAN(S): Chronic | ICD-10-CM

## 2024-11-25 DIAGNOSIS — Z96.89 PRESENCE OF OTHER SPECIFIED FUNCTIONAL IMPLANTS: Chronic | ICD-10-CM

## 2024-11-25 PROCEDURE — 72146 MRI CHEST SPINE W/O DYE: CPT

## 2024-11-25 PROCEDURE — 74018 RADEX ABDOMEN 1 VIEW: CPT | Mod: 26

## 2024-11-25 PROCEDURE — 74018 RADEX ABDOMEN 1 VIEW: CPT

## 2024-11-25 PROCEDURE — 72146 MRI CHEST SPINE W/O DYE: CPT | Mod: 26,MH

## 2024-11-26 ENCOUNTER — OUTPATIENT (OUTPATIENT)
Dept: OUTPATIENT SERVICES | Facility: HOSPITAL | Age: 79
LOS: 1 days | End: 2024-11-26
Payer: MEDICARE

## 2024-11-26 DIAGNOSIS — Z96.89 PRESENCE OF OTHER SPECIFIED FUNCTIONAL IMPLANTS: Chronic | ICD-10-CM

## 2024-11-26 DIAGNOSIS — Z90.79 ACQUIRED ABSENCE OF OTHER GENITAL ORGAN(S): Chronic | ICD-10-CM

## 2024-11-26 DIAGNOSIS — Z98.890 OTHER SPECIFIED POSTPROCEDURAL STATES: Chronic | ICD-10-CM

## 2024-11-26 DIAGNOSIS — Z98.49 CATARACT EXTRACTION STATUS, UNSPECIFIED EYE: Chronic | ICD-10-CM

## 2024-11-26 DIAGNOSIS — K44.9 DIAPHRAGMATIC HERNIA WITHOUT OBSTRUCTION OR GANGRENE: ICD-10-CM

## 2024-11-26 PROCEDURE — 74240 X-RAY XM UPR GI TRC 1CNTRST: CPT | Mod: 26

## 2024-11-26 PROCEDURE — 74240 X-RAY XM UPR GI TRC 1CNTRST: CPT

## 2024-11-27 ENCOUNTER — APPOINTMENT (OUTPATIENT)
Dept: MRI IMAGING | Facility: CLINIC | Age: 79
End: 2024-11-27
Payer: MEDICARE

## 2024-11-27 ENCOUNTER — OUTPATIENT (OUTPATIENT)
Dept: OUTPATIENT SERVICES | Facility: HOSPITAL | Age: 79
LOS: 1 days | End: 2024-11-27
Payer: MEDICARE

## 2024-11-27 DIAGNOSIS — Z96.89 PRESENCE OF OTHER SPECIFIED FUNCTIONAL IMPLANTS: Chronic | ICD-10-CM

## 2024-11-27 DIAGNOSIS — Z00.8 ENCOUNTER FOR OTHER GENERAL EXAMINATION: ICD-10-CM

## 2024-11-27 DIAGNOSIS — Z90.79 ACQUIRED ABSENCE OF OTHER GENITAL ORGAN(S): Chronic | ICD-10-CM

## 2024-11-27 DIAGNOSIS — M54.14 RADICULOPATHY, THORACIC REGION: ICD-10-CM

## 2024-11-27 DIAGNOSIS — Z98.49 CATARACT EXTRACTION STATUS, UNSPECIFIED EYE: Chronic | ICD-10-CM

## 2024-11-27 DIAGNOSIS — Z98.890 OTHER SPECIFIED POSTPROCEDURAL STATES: Chronic | ICD-10-CM

## 2024-11-27 PROCEDURE — 72141 MRI NECK SPINE W/O DYE: CPT | Mod: MH

## 2024-11-27 PROCEDURE — 72141 MRI NECK SPINE W/O DYE: CPT | Mod: 26,MH

## 2024-12-27 ENCOUNTER — NON-APPOINTMENT (OUTPATIENT)
Age: 79
End: 2024-12-27

## 2025-02-13 ENCOUNTER — EMERGENCY (EMERGENCY)
Facility: HOSPITAL | Age: 80
LOS: 1 days | Discharge: HOME | End: 2025-02-13
Attending: EMERGENCY MEDICINE | Admitting: EMERGENCY MEDICINE
Payer: MEDICARE

## 2025-02-13 VITALS
HEART RATE: 94 BPM | WEIGHT: 201.94 LBS | OXYGEN SATURATION: 97 % | DIASTOLIC BLOOD PRESSURE: 74 MMHG | SYSTOLIC BLOOD PRESSURE: 108 MMHG | HEIGHT: 68 IN | TEMPERATURE: 98 F | RESPIRATION RATE: 18 BRPM

## 2025-02-13 DIAGNOSIS — Z98.890 OTHER SPECIFIED POSTPROCEDURAL STATES: Chronic | ICD-10-CM

## 2025-02-13 DIAGNOSIS — Z96.89 PRESENCE OF OTHER SPECIFIED FUNCTIONAL IMPLANTS: Chronic | ICD-10-CM

## 2025-02-13 DIAGNOSIS — Z98.49 CATARACT EXTRACTION STATUS, UNSPECIFIED EYE: Chronic | ICD-10-CM

## 2025-02-13 DIAGNOSIS — Z90.79 ACQUIRED ABSENCE OF OTHER GENITAL ORGAN(S): Chronic | ICD-10-CM

## 2025-02-13 LAB
ALBUMIN SERPL ELPH-MCNC: 3.9 G/DL — SIGNIFICANT CHANGE UP (ref 3.3–5)
ALP SERPL-CCNC: 77 U/L — SIGNIFICANT CHANGE UP (ref 40–120)
ALT FLD-CCNC: 14 U/L — SIGNIFICANT CHANGE UP (ref 4–41)
ANION GAP SERPL CALC-SCNC: 13 MMOL/L — SIGNIFICANT CHANGE UP (ref 7–14)
ANISOCYTOSIS BLD QL: SLIGHT — SIGNIFICANT CHANGE UP
AST SERPL-CCNC: 16 U/L — SIGNIFICANT CHANGE UP (ref 4–40)
BASOPHILS # BLD AUTO: 0 K/UL — SIGNIFICANT CHANGE UP (ref 0–0.2)
BASOPHILS NFR BLD AUTO: 0 % — SIGNIFICANT CHANGE UP (ref 0–2)
BILIRUB SERPL-MCNC: 0.2 MG/DL — SIGNIFICANT CHANGE UP (ref 0.2–1.2)
BUN SERPL-MCNC: 21 MG/DL — SIGNIFICANT CHANGE UP (ref 7–23)
CALCIUM SERPL-MCNC: 8.6 MG/DL — SIGNIFICANT CHANGE UP (ref 8.4–10.5)
CHLORIDE SERPL-SCNC: 107 MMOL/L — SIGNIFICANT CHANGE UP (ref 98–107)
CO2 SERPL-SCNC: 25 MMOL/L — SIGNIFICANT CHANGE UP (ref 22–31)
CREAT SERPL-MCNC: 1.17 MG/DL — SIGNIFICANT CHANGE UP (ref 0.5–1.3)
EGFR: 63 ML/MIN/1.73M2 — SIGNIFICANT CHANGE UP
EGFR: 63 ML/MIN/1.73M2 — SIGNIFICANT CHANGE UP
ELLIPTOCYTES BLD QL SMEAR: SIGNIFICANT CHANGE UP
EOSINOPHIL # BLD AUTO: 0.06 K/UL — SIGNIFICANT CHANGE UP (ref 0–0.5)
EOSINOPHIL NFR BLD AUTO: 0.9 % — SIGNIFICANT CHANGE UP (ref 0–6)
GLUCOSE SERPL-MCNC: 70 MG/DL — SIGNIFICANT CHANGE UP (ref 70–99)
HCT VFR BLD CALC: 38.5 % — LOW (ref 39–50)
HGB BLD-MCNC: 11.3 G/DL — LOW (ref 13–17)
IANC: 3.75 K/UL — SIGNIFICANT CHANGE UP (ref 1.8–7.4)
LYMPHOCYTES # BLD AUTO: 1.47 K/UL — SIGNIFICANT CHANGE UP (ref 1–3.3)
LYMPHOCYTES # BLD AUTO: 23.9 % — SIGNIFICANT CHANGE UP (ref 13–44)
MANUAL SMEAR VERIFICATION: SIGNIFICANT CHANGE UP
MCHC RBC-ENTMCNC: 21.9 PG — LOW (ref 27–34)
MCHC RBC-ENTMCNC: 29.4 G/DL — LOW (ref 32–36)
MCV RBC AUTO: 74.6 FL — LOW (ref 80–100)
MICROCYTES BLD QL: SLIGHT — SIGNIFICANT CHANGE UP
MONOCYTES # BLD AUTO: 0.21 K/UL — SIGNIFICANT CHANGE UP (ref 0–0.9)
MONOCYTES NFR BLD AUTO: 3.5 % — SIGNIFICANT CHANGE UP (ref 2–14)
NEUTROPHILS # BLD AUTO: 4.13 K/UL — SIGNIFICANT CHANGE UP (ref 1.8–7.4)
NEUTROPHILS NFR BLD AUTO: 67.3 % — SIGNIFICANT CHANGE UP (ref 43–77)
PLAT MORPH BLD: NORMAL — SIGNIFICANT CHANGE UP
PLATELET # BLD AUTO: 297 K/UL — SIGNIFICANT CHANGE UP (ref 150–400)
PLATELET COUNT - ESTIMATE: NORMAL — SIGNIFICANT CHANGE UP
POIKILOCYTOSIS BLD QL AUTO: SIGNIFICANT CHANGE UP
POTASSIUM SERPL-MCNC: 3.7 MMOL/L — SIGNIFICANT CHANGE UP (ref 3.5–5.3)
POTASSIUM SERPL-SCNC: 3.7 MMOL/L — SIGNIFICANT CHANGE UP (ref 3.5–5.3)
PROT SERPL-MCNC: 6.9 G/DL — SIGNIFICANT CHANGE UP (ref 6–8.3)
RBC # BLD: 5.16 M/UL — SIGNIFICANT CHANGE UP (ref 4.2–5.8)
RBC # FLD: 17.9 % — HIGH (ref 10.3–14.5)
RBC BLD AUTO: ABNORMAL
SODIUM SERPL-SCNC: 145 MMOL/L — SIGNIFICANT CHANGE UP (ref 135–145)
VARIANT LYMPHS # BLD: 4.4 % — SIGNIFICANT CHANGE UP (ref 0–6)
VARIANT LYMPHS NFR BLD MANUAL: 4.4 % — SIGNIFICANT CHANGE UP (ref 0–6)
WBC # BLD: 6.14 K/UL — SIGNIFICANT CHANGE UP (ref 3.8–10.5)
WBC # FLD AUTO: 6.14 K/UL — SIGNIFICANT CHANGE UP (ref 3.8–10.5)

## 2025-02-13 PROCEDURE — 70450 CT HEAD/BRAIN W/O DYE: CPT | Mod: 26

## 2025-02-13 PROCEDURE — 93308 TTE F-UP OR LMTD: CPT | Mod: 26

## 2025-02-13 PROCEDURE — 71046 X-RAY EXAM CHEST 2 VIEWS: CPT | Mod: 26

## 2025-02-13 PROCEDURE — 93010 ELECTROCARDIOGRAM REPORT: CPT

## 2025-02-13 PROCEDURE — 73020 X-RAY EXAM OF SHOULDER: CPT | Mod: 26,RT

## 2025-02-13 PROCEDURE — 99285 EMERGENCY DEPT VISIT HI MDM: CPT

## 2025-02-13 PROCEDURE — 72125 CT NECK SPINE W/O DYE: CPT | Mod: 26

## 2025-02-13 PROCEDURE — 76775 US EXAM ABDO BACK WALL LIM: CPT | Mod: 26

## 2025-02-13 RX ORDER — LIDOCAINE HYDROCHLORIDE 20 MG/ML
1 JELLY TOPICAL ONCE
Refills: 0 | Status: COMPLETED | OUTPATIENT
Start: 2025-02-13 | End: 2025-02-13

## 2025-02-20 ENCOUNTER — APPOINTMENT (OUTPATIENT)
Dept: ORTHOPEDIC SURGERY | Facility: CLINIC | Age: 80
End: 2025-02-20
Payer: MEDICARE

## 2025-02-20 VITALS
OXYGEN SATURATION: 91 % | WEIGHT: 202 LBS | DIASTOLIC BLOOD PRESSURE: 82 MMHG | HEART RATE: 79 BPM | HEIGHT: 68 IN | SYSTOLIC BLOOD PRESSURE: 157 MMHG | BODY MASS INDEX: 30.62 KG/M2

## 2025-02-20 DIAGNOSIS — M47.812 SPONDYLOSIS W/OUT MYELOPATHY OR RADICULOPATHY, CERVICAL REGION: ICD-10-CM

## 2025-02-20 PROCEDURE — 99214 OFFICE O/P EST MOD 30 MIN: CPT

## 2025-02-25 ENCOUNTER — APPOINTMENT (OUTPATIENT)
Dept: ELECTROPHYSIOLOGY | Facility: CLINIC | Age: 80
End: 2025-02-25
Payer: MEDICARE

## 2025-02-25 ENCOUNTER — NON-APPOINTMENT (OUTPATIENT)
Age: 80
End: 2025-02-25

## 2025-02-25 VITALS
OXYGEN SATURATION: 94 % | BODY MASS INDEX: 31.37 KG/M2 | HEIGHT: 68 IN | WEIGHT: 207 LBS | SYSTOLIC BLOOD PRESSURE: 138 MMHG | HEART RATE: 76 BPM | DIASTOLIC BLOOD PRESSURE: 71 MMHG | TEMPERATURE: 98.3 F

## 2025-02-25 DIAGNOSIS — I48.0 PAROXYSMAL ATRIAL FIBRILLATION: ICD-10-CM

## 2025-02-25 DIAGNOSIS — I10 ESSENTIAL (PRIMARY) HYPERTENSION: ICD-10-CM

## 2025-02-25 PROCEDURE — 99204 OFFICE O/P NEW MOD 45 MIN: CPT

## 2025-02-25 PROCEDURE — 93000 ELECTROCARDIOGRAM COMPLETE: CPT

## 2025-02-25 PROCEDURE — G2211 COMPLEX E/M VISIT ADD ON: CPT

## 2025-02-25 RX ORDER — APIXABAN 5 MG/1
5 TABLET, FILM COATED ORAL
Qty: 60 | Refills: 0 | Status: ACTIVE | COMMUNITY
Start: 2025-02-25 | End: 1900-01-01

## 2025-02-25 RX ORDER — DILTIAZEM HYDROCHLORIDE 120 MG/1
120 CAPSULE, EXTENDED RELEASE ORAL
Qty: 30 | Refills: 3 | Status: ACTIVE | COMMUNITY
Start: 2025-02-25

## 2025-02-25 RX ORDER — PANTOPRAZOLE 40 MG/1
40 TABLET, DELAYED RELEASE ORAL DAILY
Qty: 30 | Refills: 1 | Status: ACTIVE | COMMUNITY
Start: 2025-02-25

## 2025-02-27 ENCOUNTER — APPOINTMENT (OUTPATIENT)
Dept: ELECTROPHYSIOLOGY | Facility: CLINIC | Age: 80
End: 2025-02-27

## 2025-03-13 ENCOUNTER — NON-APPOINTMENT (OUTPATIENT)
Age: 80
End: 2025-03-13

## 2025-03-18 ENCOUNTER — NON-APPOINTMENT (OUTPATIENT)
Age: 80
End: 2025-03-18

## 2025-03-18 ENCOUNTER — APPOINTMENT (OUTPATIENT)
Dept: ELECTROPHYSIOLOGY | Facility: CLINIC | Age: 80
End: 2025-03-18
Payer: MEDICARE

## 2025-03-18 VITALS
OXYGEN SATURATION: 97 % | TEMPERATURE: 98.1 F | BODY MASS INDEX: 31.22 KG/M2 | RESPIRATION RATE: 16 BRPM | WEIGHT: 206 LBS | HEIGHT: 68 IN | DIASTOLIC BLOOD PRESSURE: 67 MMHG | HEART RATE: 75 BPM | SYSTOLIC BLOOD PRESSURE: 123 MMHG

## 2025-03-18 DIAGNOSIS — I48.0 PAROXYSMAL ATRIAL FIBRILLATION: ICD-10-CM

## 2025-03-18 DIAGNOSIS — E78.00 PURE HYPERCHOLESTEROLEMIA, UNSPECIFIED: ICD-10-CM

## 2025-03-18 DIAGNOSIS — D64.9 ANEMIA, UNSPECIFIED: ICD-10-CM

## 2025-03-18 DIAGNOSIS — I10 ESSENTIAL (PRIMARY) HYPERTENSION: ICD-10-CM

## 2025-03-18 PROCEDURE — 93000 ELECTROCARDIOGRAM COMPLETE: CPT

## 2025-03-18 PROCEDURE — 99215 OFFICE O/P EST HI 40 MIN: CPT

## 2025-04-07 ENCOUNTER — OUTPATIENT (OUTPATIENT)
Dept: OUTPATIENT SERVICES | Facility: HOSPITAL | Age: 80
LOS: 1 days | End: 2025-04-07

## 2025-04-07 VITALS
DIASTOLIC BLOOD PRESSURE: 83 MMHG | RESPIRATION RATE: 18 BRPM | WEIGHT: 197.98 LBS | OXYGEN SATURATION: 97 % | SYSTOLIC BLOOD PRESSURE: 152 MMHG | HEIGHT: 67 IN | HEART RATE: 84 BPM | TEMPERATURE: 98 F

## 2025-04-07 DIAGNOSIS — Z98.890 OTHER SPECIFIED POSTPROCEDURAL STATES: Chronic | ICD-10-CM

## 2025-04-07 DIAGNOSIS — G47.33 OBSTRUCTIVE SLEEP APNEA (ADULT) (PEDIATRIC): ICD-10-CM

## 2025-04-07 DIAGNOSIS — Z98.49 CATARACT EXTRACTION STATUS, UNSPECIFIED EYE: Chronic | ICD-10-CM

## 2025-04-07 DIAGNOSIS — I48.0 PAROXYSMAL ATRIAL FIBRILLATION: ICD-10-CM

## 2025-04-07 DIAGNOSIS — Z96.89 PRESENCE OF OTHER SPECIFIED FUNCTIONAL IMPLANTS: Chronic | ICD-10-CM

## 2025-04-07 DIAGNOSIS — Z90.79 ACQUIRED ABSENCE OF OTHER GENITAL ORGAN(S): Chronic | ICD-10-CM

## 2025-04-07 LAB
ANION GAP SERPL CALC-SCNC: 13 MMOL/L — SIGNIFICANT CHANGE UP (ref 7–14)
BASOPHILS # BLD AUTO: 0.05 K/UL — SIGNIFICANT CHANGE UP (ref 0–0.2)
BASOPHILS NFR BLD AUTO: 0.7 % — SIGNIFICANT CHANGE UP (ref 0–2)
BLD GP AB SCN SERPL QL: NEGATIVE — SIGNIFICANT CHANGE UP
BUN SERPL-MCNC: 20 MG/DL — SIGNIFICANT CHANGE UP (ref 7–23)
CALCIUM SERPL-MCNC: 8.9 MG/DL — SIGNIFICANT CHANGE UP (ref 8.4–10.5)
CHLORIDE SERPL-SCNC: 104 MMOL/L — SIGNIFICANT CHANGE UP (ref 98–107)
CO2 SERPL-SCNC: 28 MMOL/L — SIGNIFICANT CHANGE UP (ref 22–31)
CREAT SERPL-MCNC: 1.2 MG/DL — SIGNIFICANT CHANGE UP (ref 0.5–1.3)
CRP SERPL-MCNC: <3 MG/L — SIGNIFICANT CHANGE UP
EGFR: 62 ML/MIN/1.73M2 — SIGNIFICANT CHANGE UP
EGFR: 62 ML/MIN/1.73M2 — SIGNIFICANT CHANGE UP
EOSINOPHIL # BLD AUTO: 0.08 K/UL — SIGNIFICANT CHANGE UP (ref 0–0.5)
EOSINOPHIL NFR BLD AUTO: 1.1 % — SIGNIFICANT CHANGE UP (ref 0–6)
GLUCOSE SERPL-MCNC: 164 MG/DL — HIGH (ref 70–99)
HCT VFR BLD CALC: 33.9 % — LOW (ref 39–50)
HGB BLD-MCNC: 10.1 G/DL — LOW (ref 13–17)
IMM GRANULOCYTES NFR BLD AUTO: 0.3 % — SIGNIFICANT CHANGE UP (ref 0–0.9)
IRON SATN MFR SERPL: 17 UG/DL — LOW (ref 45–165)
IRON SATN MFR SERPL: 5 % — LOW (ref 16–55)
LYMPHOCYTES # BLD AUTO: 1.52 K/UL — SIGNIFICANT CHANGE UP (ref 1–3.3)
LYMPHOCYTES # BLD AUTO: 20.9 % — SIGNIFICANT CHANGE UP (ref 13–44)
MCHC RBC-ENTMCNC: 21.9 PG — LOW (ref 27–34)
MCHC RBC-ENTMCNC: 29.8 G/DL — LOW (ref 32–36)
MCV RBC AUTO: 73.5 FL — LOW (ref 80–100)
MONOCYTES # BLD AUTO: 0.41 K/UL — SIGNIFICANT CHANGE UP (ref 0–0.9)
MONOCYTES NFR BLD AUTO: 5.6 % — SIGNIFICANT CHANGE UP (ref 2–14)
NEUTROPHILS # BLD AUTO: 5.21 K/UL — SIGNIFICANT CHANGE UP (ref 1.8–7.4)
NEUTROPHILS NFR BLD AUTO: 71.4 % — SIGNIFICANT CHANGE UP (ref 43–77)
PLATELET # BLD AUTO: 335 K/UL — SIGNIFICANT CHANGE UP (ref 150–400)
POTASSIUM SERPL-MCNC: 3.3 MMOL/L — LOW (ref 3.5–5.3)
POTASSIUM SERPL-SCNC: 3.3 MMOL/L — LOW (ref 3.5–5.3)
RBC # BLD: 4.61 M/UL — SIGNIFICANT CHANGE UP (ref 4.2–5.8)
RBC # FLD: 16.8 % — HIGH (ref 10.3–14.5)
RETICS #: 70.5 K/UL — SIGNIFICANT CHANGE UP (ref 25–125)
RETICS/RBC NFR: 1.6 % — SIGNIFICANT CHANGE UP (ref 0.5–2.5)
RH IG SCN BLD-IMP: POSITIVE — SIGNIFICANT CHANGE UP
RH IG SCN BLD-IMP: POSITIVE — SIGNIFICANT CHANGE UP
SODIUM SERPL-SCNC: 145 MMOL/L — SIGNIFICANT CHANGE UP (ref 135–145)
TIBC SERPL-MCNC: 376 UG/DL — SIGNIFICANT CHANGE UP (ref 220–430)
UIBC SERPL-MCNC: 359 UG/DL — SIGNIFICANT CHANGE UP (ref 110–370)
WBC # BLD: 7.29 K/UL — SIGNIFICANT CHANGE UP (ref 3.8–10.5)
WBC # FLD AUTO: 7.29 K/UL — SIGNIFICANT CHANGE UP (ref 3.8–10.5)

## 2025-04-07 RX ORDER — DILTIAZEM HYDROCHLORIDE 240 MG/1
1 TABLET, EXTENDED RELEASE ORAL
Refills: 0 | DISCHARGE

## 2025-04-07 RX ORDER — HYDROCODONE BITARTRATE AND ACETAMINOPHEN 5; 325 MG/1; MG/1
1 TABLET ORAL
Refills: 0 | DISCHARGE

## 2025-04-07 NOTE — H&P PST ADULT - NEGATIVE ENMT SYMPTOMS
no hearing difficulty/no ear pain/no tinnitus/no vertigo/no sinus symptoms/no nasal congestion/no nasal obstruction/no post-nasal discharge/no nose bleeds/no recurrent cold sores/no abnormal taste sensation/no gum bleeding

## 2025-04-07 NOTE — H&P PST ADULT - PROBLEM SELECTOR PLAN 1
Scheduled for Afib ablation  Preop instructions provided and patient verbalizes understanding.  Hibiclens and Famotidine provided with instructions.  BMP, CBC, T&S results  pending      asper EPS instruction pt to hold all cardiac medication including  anticoagulation am of surgery

## 2025-04-07 NOTE — H&P PST ADULT - NSICDXPASTMEDICALHX_GEN_ALL_CORE_FT
PAST MEDICAL HISTORY:  Atrial fibrillation     Battery end of life of spinal cord stimulator     Chronic back pain     Folliculitis     History of DVT (deep vein thrombosis) s/p AAA repair 2007    HTN (Hypertension)     Hyperlipidemia     Lumbar disc disease     NATHAN on CPAP     Spinal stenosis

## 2025-04-07 NOTE — H&P PST ADULT - OPHTHALMOLOGIC COMMENTS
corrective lenses, cataract extraction, 5 years ago  right retinal bleeding s/p right surgery followed by retinal specialist

## 2025-04-07 NOTE — H&P PST ADULT - HISTORY OF PRESENT ILLNESS
80 y/o man with Hx of HTN, NATHAN on CPAP, HLD, Spinal Stenosis, chronic back and neck pain, anemia, s/p ED visit 2/13/25 for near syncope who presents for an initial evaluation or PAF. He presented to the ED for episode of dizziness and neck/shoulder pain today s/p fall. Pt states he was on a chair that he normally will sit in and was sitting forward because of his back pain and fell forward. Pt does not think he passed out and was seen awake after the fall by his wife. Pt notes his right shoulder hit into the chair in front. Pt notes after having some dizziness that resolved but did return when he tried to work out. His wife reported his blood pressure 93/60 and with repeat 125. Pt followed up with his cardiologist and has an event monitor on and found to have PAF episodes and started diltiazem 120 mg daily on 2/21/25. CHADSVASC score =2-3. Event monitor with a 5.9% afib burden. Pt reports he feels fatigue with walking approx 2 blocks with a cane. He had an echo with normal LVEF 54% on recent echo 1/22/25. EKG from cardiologist with NSR 75 with 1st degree AVB and is in sinus 78 yo male Now present in Presurgical testing for preop evaluation for scheduled procedure atrial fib ablation W/PFA    80 y/o man with Hx of Pt instructed to take , HLD, , anemia, s/p ED visit 2/13/25 for near syncope ED for episode of dizziness and neck/shoulder pain s/p fall. Pt states he was on a chair that he normally will sit in and was sitting forward because of his back pain and fell forward. Pt notes his right shoulder hit into the chair in front. had Ekg done at ER WNL shoulder xray negative pt was discharged and  followed up with his cardiologist.  Pt was placed on 2 week event monitor on and found to have PAF episodes and started diltiazem 120 mg daily on 2/21/25.  Pt was then referred to Dr. Pena  for evaluation for PAF   80 yo male Now present in Presurgical testing for preop evaluation for scheduled procedure atrial fib ablation W/PFA    78 y/o man with Hx hypertension, AAA repair 2005, iliac artery aneurysm repair 4 years ago   HLD, , anemia, Pt went ti   ED  2/13/25 for near syncope.  Pt reported he had an episode of dizziness and neck/shoulder pain s/p fall. Pt states he was  sitting forward because of his back pain and fell forward. Pt notes his right shoulder hit into the chair in front. Pt. had Ekg done at ER WNL shoulder xray negative pt was discharged and  followed up with his cardiologist.  Pt was placed on 2 week event monitor on and found to have PAF episodes and started diltiazem 120 mg daily on 2/21/25.  Pt was then referred to Dr. Pena  for evaluation for PAF

## 2025-04-07 NOTE — H&P PST ADULT - MUSCULOSKELETAL COMMENTS
Cervical stenosis of spine, Chronic back pain, Degenerative cervical disc- S/P insertion of spinal cord stimulato8 years ago battery replaced spring 2024r

## 2025-04-07 NOTE — H&P PST ADULT - CARDIOVASCULAR COMMENTS
carotid artery stenosis, Hypertension, dyslipidemia, Aneurysm of left internal iliac artery, paroxysmal atrial fibrillation) carotid artery stenosis, Hypertension, dyslipidemia, Aneurysm of left internal iliac artery repair (4 years ago), AAA repair(2015) paroxysmal atrial fibrillation) dx with proximal afib

## 2025-04-08 LAB — FERRITIN SERPL-MCNC: 7 NG/ML — LOW (ref 30–400)

## 2025-04-08 RX ORDER — FERRIC DERISOMALTOSE 1000 MG/10ML
1000 SOLUTION INTRAVENOUS
Qty: 10 | Refills: 0
Start: 2025-04-08

## 2025-04-08 RX ORDER — IRON DEXTRAN COMPLEX 100 MG/2ML
25 VIAL (ML) INJECTION
Qty: 1 | Refills: 0
Start: 2025-04-08

## 2025-04-08 RX ORDER — IRON DEXTRAN COMPLEX 100 MG/2ML
975 VIAL (ML) INJECTION
Qty: 10 | Refills: 0
Start: 2025-04-08

## 2025-04-08 NOTE — PROGRESS NOTE ADULT - SUBJECTIVE AND OBJECTIVE BOX
Provider Specialty: Anesthesiology    Reason for referral/consultation:  Anemia    Subjective/Objective:    This is a  79y y.o patient, who is scheduled for an afib ablation with Dr. Pena on 4/15/25.      During the preoperative evaluation, the patient was diagnosed with iron deficiency anemia.  The patient was contacted by phone.  The diagnosis and treatment were explained in detail, including risks and benefits.  Questions were encouraged and answered.  The patient is agreeable with the plan.    Health Issues:  HTN (Hypertension)    Hyperlipidemia    Lumbar disc disease    Spinal stenosis    Chronic back pain    Folliculitis    History of DVT (deep vein thrombosis)    NATHAN on CPAP    Battery end of life of spinal cord stimulator    Atrial fibrillation    Paroxysmal atrial fibrillation    NATHAN on CPAP        Medications:      Allergies:  clindamycin (Rash)  Printing ink (Rash)      Labs:  Hb 10.1 g/dL  Hct 33.9 %  Ferritin 7 ng/mL  Ferritin, Serum --  BUN --  CRT --      Assessment:      Iron Deficiency Anemia.  Preoperative anemia has been shown to be an independent risk factor for perioperative morbidity and mortality. This risk is further exacerbated by surgical blood loss and is not mitigated by allogeneic transfusion and the presence of anemia should be evaluated in all surgical patients, especially in those where moderate blood loss is anticipated.    Recommendation:      IV iron 1000 mg x 1 dose.  IV iron is safe and efficacious and should be used as front-line therapy in patients in whom surgery is planned for less than 6 weeks after the diagnosis of anemia, who do not respond to oral iron or are not able to tolerate it.  Suggest repeat lab work 2-4 weeks after iron infusion.

## 2025-04-14 NOTE — ASU PATIENT PROFILE, ADULT - FALL HARM RISK - HARM RISK INTERVENTIONS

## 2025-04-15 ENCOUNTER — OUTPATIENT (OUTPATIENT)
Dept: OUTPATIENT SERVICES | Facility: HOSPITAL | Age: 80
LOS: 1 days | End: 2025-04-15
Payer: MEDICARE

## 2025-04-15 ENCOUNTER — TRANSCRIPTION ENCOUNTER (OUTPATIENT)
Age: 80
End: 2025-04-15

## 2025-04-15 ENCOUNTER — RESULT REVIEW (OUTPATIENT)
Age: 80
End: 2025-04-15

## 2025-04-15 VITALS
RESPIRATION RATE: 16 BRPM | OXYGEN SATURATION: 95 % | HEART RATE: 79 BPM | WEIGHT: 197.09 LBS | DIASTOLIC BLOOD PRESSURE: 85 MMHG | HEIGHT: 67 IN | SYSTOLIC BLOOD PRESSURE: 167 MMHG | TEMPERATURE: 98 F

## 2025-04-15 VITALS
RESPIRATION RATE: 15 BRPM | SYSTOLIC BLOOD PRESSURE: 134 MMHG | HEART RATE: 99 BPM | OXYGEN SATURATION: 97 % | DIASTOLIC BLOOD PRESSURE: 82 MMHG

## 2025-04-15 DIAGNOSIS — Z90.79 ACQUIRED ABSENCE OF OTHER GENITAL ORGAN(S): Chronic | ICD-10-CM

## 2025-04-15 DIAGNOSIS — Z96.89 PRESENCE OF OTHER SPECIFIED FUNCTIONAL IMPLANTS: Chronic | ICD-10-CM

## 2025-04-15 DIAGNOSIS — Z98.890 OTHER SPECIFIED POSTPROCEDURAL STATES: Chronic | ICD-10-CM

## 2025-04-15 DIAGNOSIS — Z98.49 CATARACT EXTRACTION STATUS, UNSPECIFIED EYE: Chronic | ICD-10-CM

## 2025-04-15 DIAGNOSIS — I48.0 PAROXYSMAL ATRIAL FIBRILLATION: ICD-10-CM

## 2025-04-15 LAB
BASE EXCESS BLDV CALC-SCNC: 3.5 MMOL/L — HIGH (ref -2–3)
CA-I SERPL-SCNC: 1.09 MMOL/L — LOW (ref 1.15–1.33)
CO2 BLDV-SCNC: 29 MMOL/L — HIGH (ref 22–26)
GAS PNL BLDV: 140 MMOL/L — SIGNIFICANT CHANGE UP (ref 136–145)
GLUCOSE BLDV-MCNC: 121 MG/DL — HIGH (ref 70–99)
HCO3 BLDV-SCNC: 28 MMOL/L — SIGNIFICANT CHANGE UP (ref 22–29)
HCT VFR BLDA CALC: 30 % — SIGNIFICANT CHANGE UP
HGB BLD CALC-MCNC: 10 G/DL — LOW (ref 12.6–17.4)
LACTATE BLDV-MCNC: 1.2 MMOL/L — SIGNIFICANT CHANGE UP (ref 0.5–2)
PCO2 BLDV: 40 MMHG — LOW (ref 42–55)
PH BLDV: 7.45 — HIGH (ref 7.32–7.43)
PO2 BLDV: 50 MMHG — HIGH (ref 25–45)
POTASSIUM BLDV-SCNC: 3.6 MMOL/L — SIGNIFICANT CHANGE UP (ref 3.5–5.1)
SAO2 % BLDV: 84 % — SIGNIFICANT CHANGE UP (ref 67–88)

## 2025-04-15 PROCEDURE — 76376 3D RENDER W/INTRP POSTPROCES: CPT | Mod: 26,59

## 2025-04-15 PROCEDURE — 93355 ECHO TRANSESOPHAGEAL (TEE): CPT

## 2025-04-15 PROCEDURE — 93010 ELECTROCARDIOGRAM REPORT: CPT

## 2025-04-15 PROCEDURE — 33340 PERQ CLSR TCAT L ATR APNDGE: CPT | Mod: Q0

## 2025-04-15 PROCEDURE — 93657 TX L/R ATRIAL FIB ADDL: CPT

## 2025-04-15 PROCEDURE — 71045 X-RAY EXAM CHEST 1 VIEW: CPT | Mod: 26

## 2025-04-15 PROCEDURE — 93656 COMPRE EP EVAL ABLTJ ATR FIB: CPT

## 2025-04-15 RX ORDER — APIXABAN 2.5 MG/1
1 TABLET, FILM COATED ORAL
Qty: 0 | Refills: 0 | DISCHARGE

## 2025-04-15 NOTE — ASU DISCHARGE PLAN (ADULT/PEDIATRIC) - ASU DC SPECIAL INSTRUCTIONSFT
Patient is s/p Afib ablation with Dr. Pena.  Teaching provided to patient regarding right groin site care.  Right groin without hematoma, ecchymosis, drainage, pain, or bleeding.    - may shower after 24 hrs after suture removal, otherwise keep groin incision sites dry and clean.    - Avoid activities such as jogging/excessive stair climbing/weight lifting for the next 7-8 days    - Take Acetaminophen (Tylenol) 500mg, one to two tablets every 8 hours as needed for pain relief.    - Pt was instructed to call [phone] if the following occurs:      - fever with temperature > 101      - swelling or bleeding at the groin incision site(s)  - Outpatient F/U is scheduled with Dr. Pena on 5/20/25 @ 2:15 pm    All questions answered to patient's satisfaction.  Follow up letter and instructions left in the care of the patient.    Patient has suture at the right groin site. [facility]

## 2025-04-15 NOTE — PRE PROCEDURE NOTE - PRE PROCEDURE EVALUATION
Interventional Recovery Suite PA Note    Patient is a 79 yrs old male with a PMH of HTN, HLD, AAA repair 2005, iliac artery aneurysm repair 4 years ago, recently found new onset of A fib arrived for elective ROMANA/A Fib Ablation/ Watchamn implant.  The process of the procedures along with the risk and benefits for the procedure were explained in detail which included but not limited to bleeding, infection, stroke,  esophageal injury, complication from anesthesia, cardiac tamponade, heart block requiring  pacemaker, intubation, and death. Patient expressed understanding an all questions were answered.   PST H&P appreciated;  Patient seen and examined today; physical exam unremarkable.   Pt has a "[employer] pain stimulator for pain management in his back.   Medication reconciliation reviewed, this morning the patient did not take any meds.Last dose of Eliquis 5 mg was taken last night around 7pm.  The patient denies chest pain, SOB, palpitations, dizziness, presyncope, syncope,  headache, visual disturbances, PE, DVT, NATHAN, abdominal pain, N/V/D/C, hematochezia, melena, dysuria, hematuria, fever, chills, ulcers.      NPO Date and Time: 4/14/25 at 9pm  Mallampati: 2  Anticoagulation Date and Time? Eliquis 5mg on 4/14/25 at 9pm  Previous Endoscopy? Yes  Hx of CVA?  NO  Sleep Apnea?  No  Dentures? NO  Loose Teeth? NO      Patient Denies:    Prior difficult intubation or airway problems  Odynophagia  Dysphagia  Esophageal stricture  Esophageal tumor  Esophageal varices  Esophageal perforation/laceration  Esophageal diverticulum  Large diaphragmatic Hernia  Active or recent upper gastrointestinal (GI) bleed  History of GI surgery  History of Esophageal surgery  History of Keith's esophagus  Tenuous cardiorespiratory status  Cervical spine arthritis with reduced range of motion or atlantoaxial joint disease. History of radiation to head, neck, or mediastinum  Severe thrombocytopenia

## 2025-04-15 NOTE — ASU DISCHARGE PLAN (ADULT/PEDIATRIC) - FINANCIAL ASSISTANCE
[facility] provides services at a reduced cost to those who are determined to be eligible through [facility]’s financial assistance program. Information regarding [facility]’s financial assistance program can be found by going to [email or web address] or by calling [phone].

## 2025-04-16 ENCOUNTER — NON-APPOINTMENT (OUTPATIENT)
Age: 80
End: 2025-04-16

## 2025-04-16 ENCOUNTER — APPOINTMENT (OUTPATIENT)
Dept: ELECTROPHYSIOLOGY | Facility: CLINIC | Age: 80
End: 2025-04-16

## 2025-05-01 PROBLEM — I48.91 UNSPECIFIED ATRIAL FIBRILLATION: Chronic | Status: ACTIVE | Noted: 2025-04-07

## 2025-05-09 ENCOUNTER — APPOINTMENT (OUTPATIENT)
Dept: VASCULAR SURGERY | Facility: CLINIC | Age: 80
End: 2025-05-09
Payer: MEDICARE

## 2025-05-09 VITALS
DIASTOLIC BLOOD PRESSURE: 84 MMHG | WEIGHT: 197 LBS | HEIGHT: 67 IN | HEART RATE: 69 BPM | TEMPERATURE: 97.9 F | SYSTOLIC BLOOD PRESSURE: 135 MMHG | BODY MASS INDEX: 30.92 KG/M2

## 2025-05-09 DIAGNOSIS — I87.2 VENOUS INSUFFICIENCY (CHRONIC) (PERIPHERAL): ICD-10-CM

## 2025-05-09 DIAGNOSIS — I72.3 ANEURYSM OF ILIAC ARTERY: ICD-10-CM

## 2025-05-09 DIAGNOSIS — I71.40 ABDOMINAL AORTIC ANEURYSM, WITHOUT RUPTURE, UNSPECIFIED: ICD-10-CM

## 2025-05-09 PROCEDURE — 93926 LOWER EXTREMITY STUDY: CPT

## 2025-05-09 PROCEDURE — 93978 VASCULAR STUDY: CPT

## 2025-05-09 PROCEDURE — 99214 OFFICE O/P EST MOD 30 MIN: CPT

## 2025-05-20 ENCOUNTER — NON-APPOINTMENT (OUTPATIENT)
Age: 80
End: 2025-05-20

## 2025-05-20 ENCOUNTER — APPOINTMENT (OUTPATIENT)
Dept: ELECTROPHYSIOLOGY | Facility: CLINIC | Age: 80
End: 2025-05-20
Payer: MEDICARE

## 2025-05-20 VITALS
HEIGHT: 67 IN | DIASTOLIC BLOOD PRESSURE: 83 MMHG | OXYGEN SATURATION: 97 % | SYSTOLIC BLOOD PRESSURE: 134 MMHG | WEIGHT: 197 LBS | BODY MASS INDEX: 30.92 KG/M2 | HEART RATE: 68 BPM

## 2025-05-20 DIAGNOSIS — Z95.818 PRESENCE OF OTHER CARDIAC IMPLANTS AND GRAFTS: ICD-10-CM

## 2025-05-20 DIAGNOSIS — Z86.79 OTHER SPECIFIED POSTPROCEDURAL STATES: ICD-10-CM

## 2025-05-20 DIAGNOSIS — I10 ESSENTIAL (PRIMARY) HYPERTENSION: ICD-10-CM

## 2025-05-20 DIAGNOSIS — I48.0 PAROXYSMAL ATRIAL FIBRILLATION: ICD-10-CM

## 2025-05-20 DIAGNOSIS — I71.40 ABDOMINAL AORTIC ANEURYSM, WITHOUT RUPTURE, UNSPECIFIED: ICD-10-CM

## 2025-05-20 DIAGNOSIS — E78.00 PURE HYPERCHOLESTEROLEMIA, UNSPECIFIED: ICD-10-CM

## 2025-05-20 DIAGNOSIS — Z98.890 OTHER SPECIFIED POSTPROCEDURAL STATES: ICD-10-CM

## 2025-05-20 PROCEDURE — 93000 ELECTROCARDIOGRAM COMPLETE: CPT

## 2025-05-20 PROCEDURE — 99214 OFFICE O/P EST MOD 30 MIN: CPT

## 2025-05-20 PROCEDURE — G2211 COMPLEX E/M VISIT ADD ON: CPT

## 2025-05-20 RX ORDER — FERROUS SULFATE 325(65) MG
200 (65 FE) TABLET ORAL
Qty: 90 | Refills: 1 | Status: ACTIVE | COMMUNITY
Start: 2025-05-20

## 2025-05-30 ENCOUNTER — TRANSCRIPTION ENCOUNTER (OUTPATIENT)
Age: 80
End: 2025-05-30

## 2025-05-30 ENCOUNTER — OUTPATIENT (OUTPATIENT)
Dept: OUTPATIENT SERVICES | Facility: HOSPITAL | Age: 80
LOS: 1 days | End: 2025-05-30
Payer: MEDICARE

## 2025-05-30 ENCOUNTER — RESULT REVIEW (OUTPATIENT)
Age: 80
End: 2025-05-30

## 2025-05-30 VITALS
OXYGEN SATURATION: 99 % | HEART RATE: 58 BPM | DIASTOLIC BLOOD PRESSURE: 93 MMHG | SYSTOLIC BLOOD PRESSURE: 158 MMHG | RESPIRATION RATE: 16 BRPM

## 2025-05-30 VITALS — WEIGHT: 192.02 LBS | HEIGHT: 67 IN

## 2025-05-30 DIAGNOSIS — Z98.890 OTHER SPECIFIED POSTPROCEDURAL STATES: Chronic | ICD-10-CM

## 2025-05-30 DIAGNOSIS — Z90.79 ACQUIRED ABSENCE OF OTHER GENITAL ORGAN(S): Chronic | ICD-10-CM

## 2025-05-30 DIAGNOSIS — Z98.49 CATARACT EXTRACTION STATUS, UNSPECIFIED EYE: Chronic | ICD-10-CM

## 2025-05-30 DIAGNOSIS — Z96.89 PRESENCE OF OTHER SPECIFIED FUNCTIONAL IMPLANTS: Chronic | ICD-10-CM

## 2025-05-30 DIAGNOSIS — I48.0 PAROXYSMAL ATRIAL FIBRILLATION: ICD-10-CM

## 2025-05-30 DIAGNOSIS — Z90.2 ACQUIRED ABSENCE OF LUNG [PART OF]: Chronic | ICD-10-CM

## 2025-05-30 PROBLEM — G47.33 OBSTRUCTIVE SLEEP APNEA (ADULT) (PEDIATRIC): Chronic | Status: INACTIVE | Noted: 2021-05-11 | Resolved: 2025-05-30

## 2025-05-30 PROBLEM — E78.5 HYPERLIPIDEMIA, UNSPECIFIED: Chronic | Status: INACTIVE | Noted: 2017-07-10 | Resolved: 2025-05-30

## 2025-05-30 PROBLEM — I48.91 UNSPECIFIED ATRIAL FIBRILLATION: Chronic | Status: INACTIVE | Noted: 2025-04-07 | Resolved: 2025-05-30

## 2025-05-30 PROBLEM — L73.9 FOLLICULAR DISORDER, UNSPECIFIED: Chronic | Status: INACTIVE | Noted: 2017-07-10 | Resolved: 2025-05-30

## 2025-05-30 PROBLEM — Z86.718 PERSONAL HISTORY OF OTHER VENOUS THROMBOSIS AND EMBOLISM: Chronic | Status: INACTIVE | Noted: 2017-07-10 | Resolved: 2025-05-30

## 2025-05-30 PROBLEM — Z45.42 ENCOUNTER FOR ADJUSTMENT AND MANAGEMENT OF NEUROSTIMULATOR: Chronic | Status: INACTIVE | Noted: 2024-06-11 | Resolved: 2025-05-30

## 2025-05-30 PROCEDURE — 76376 3D RENDER W/INTRP POSTPROCES: CPT | Mod: 26

## 2025-05-30 PROCEDURE — 93306 TTE W/DOPPLER COMPLETE: CPT | Mod: 26

## 2025-05-30 RX ORDER — GABAPENTIN 400 MG/1
1 CAPSULE ORAL
Refills: 0 | DISCHARGE

## 2025-05-30 RX ORDER — LISINOPRIL 5 MG/1
1 TABLET ORAL
Refills: 0 | DISCHARGE

## 2025-05-30 NOTE — H&P CARDIOLOGY - NSICDXPASTMEDICALHX_GEN_ALL_CORE_FT
PAST MEDICAL HISTORY:  AAA (abdominal aortic aneurysm)     BPH (benign prostatic hyperplasia)     HLD (hyperlipidemia)     HTN (hypertension)     Iliac artery aneurysm     Lumbar disc disease     NATHAN (obstructive sleep apnea)     PAF (paroxysmal atrial fibrillation)     Spinal stenosis

## 2025-05-30 NOTE — H&P CARDIOLOGY - HISTORY OF PRESENT ILLNESS
78 y/o male with a PMHx of paroxysmal atrial fibrillation s/p ablation and Watchman implantation (April 15, 2025) on Eliquis (last dose this morning), AAA s/p repair, iliac artery aneurysm s/p repair, HTN, HLD, BPH s/p TURP, NATHAN on CPAP QHS and lumbar spinal stenosis (spinal cord stimulator) presents for post Watchman ROMANA. Pt has had no issues since his procedures last month and has been tolerating Eliquis without issues or bleeding. Pt says his incision took some time to heal but it is now well healed. Pt denies fever, chills, recent travel, headache, dizziness, visual deficits, chest pain, shortness of breath, orthopnea, palpitations, abdominal pain, N/V/D/C, hematochezia, melena, dysuria, hematuria, LOC, syncope, peripheral edema. Pt now presents for ROMANA to evaluate Watchman.    NPO date and time: 5/29/2025 at 18:00  Mallampati: 2  Anticoagulation date and time? Eliquis taken this morning at 06:00am  Previous endoscopy? Yes    History of:  CVA? No  Sleep apnea? Yes  Dentures? No  Loose teeth? No    Patient denies:  Prior difficult intubation or airway problems  Odynophagia  Dysphagia  Esophageal stricture  Esophageal tumor  Esophageal varices  Esophageal perforation/laceration  Esophageal diverticulum  Large diaphragmatic hernia  Active or recent upper gastrointestinal (GI) bleed  History of GI surgery  History of esophageal surgery  History of Keith's esophagus  Tenuous cardiorespiratory status  Cervical spine arthritis with reduced range of motion or atlantoaxial joint disease  History of radiation to head, neck, or mediastinum  Severe thrombocytopenia    EP: Dr. Slim Pena

## 2025-05-30 NOTE — ASU DISCHARGE PLAN (ADULT/PEDIATRIC) - NS MD DC FALL RISK RISK
For information on Fall & Injury Prevention, visit: [email or web address] OR  [email or web address] OR  https://www.cdc.gov/steadi/patient.html

## 2025-05-30 NOTE — CHART NOTE - NSCHARTNOTEFT_GEN_A_CORE
Pt s/p ROMANA showing small leak on Watchman as per Dr. Quezada. Discussed with Dr. Pena, continue on Eliquis. Pt can be discharged home and follow up with Dr. Pena on June 6 for scheduled appt.

## 2025-05-30 NOTE — ASU DISCHARGE PLAN (ADULT/PEDIATRIC) - PROVIDER TOKENS
PROVIDER:[TOKEN:[[id number]],SCHEDULEDAPPT:[06/06/2025],SCHEDULEDAPPTTIME:[08:15 AM],ESTABLISHEDPATIENT:[T]]

## 2025-05-30 NOTE — H&P CARDIOLOGY - ALCOHOL USE HISTORY SINGLE SELECT
Patient accepted and admission order received from:: LORA BOSCH [[id number]]   Patient Class: Inpatient [1]   Patient on Telemetry: Yes   Has physician to physician communication occurred?: Yes   Transferring Patient to? Only adjust for transfers between [facility], [facility], [facility]: [facility] [[id number]]  
never

## 2025-05-30 NOTE — ASU DISCHARGE PLAN (ADULT/PEDIATRIC) - ASU DC SPECIAL INSTRUCTIONSFT
DIET:  - Do not eat or drink for 2 hours post procedure, unless instructed by your nurse.  - Once 2 hours have passed, start with a sip of water. If tolerated (without coughing, nausea, vomiting, difficulty swallowing), you may advance to the same diet you were on prior to your procedure. If you do not tolerate water, please wait an additional 30 minutes and repeat the process.  - A mild sore throat is expected. If you are unable to swallow water, experience severe nausea, vomiting, coughing, bleeding, or fever, do not take anything else by mouth. Please call the office at [phone] immediately and ask to speak to a cardiologist. If you are unable to reach the office, please proceed to the nearest emergency room.    ACTIVITY:  For the next 24 hours:  - Do not drive or operate heavy machinery.  - Do not drink any alcoholic beverages.  - Do not make any important decisions or sign legal documents.  - Resume normal activity 24 hours post procedural completion.    MEDICATION:   - Take your medications as explained (see attached medication reconciliation on discharge paperwork).  - Continue Eliquis until your follow up appointment with Dr. Pena.    ADDITIONAL INSTRUCTIONS:  - Call your doctor to make or confirm your follow up appointment.  - If you are unable to get in contact with your doctor, you may contact the [facility] at [phone].  - Please reference your discharge instructions for any questions or concerns.

## 2025-05-30 NOTE — H&P CARDIOLOGY - NSICDXPASTSURGICALHX_GEN_ALL_CORE_FT
PAST SURGICAL HISTORY:  S/P AAA (abdominal aortic aneurysm) repair     S/P Achilles tendon repair     S/P cataract extraction     S/P insertion of spinal cord stimulator 7/2017    S/P partial lobectomy of lung     S/P repair of hydrocele     S/P TURP (transurethral resection of prostate) 1993

## 2025-05-30 NOTE — ASU DISCHARGE PLAN (ADULT/PEDIATRIC) - FINANCIAL ASSISTANCE
[facility] provides services at a reduced cost to those who are determined to be eligible through [facility]’s financial assistance program. Information regarding [facility]’s financial assistance program can be found by going to [email or web address] or by calling [phone].

## 2025-05-30 NOTE — ASU DISCHARGE PLAN (ADULT/PEDIATRIC) - CARE PROVIDER_API CALL
Slim Pena  Cardiovascular Disease  [address] [address] [place] [address], [address]  [place] [address]  Phone: [phone]  Fax: [phone]  Established Patient  Scheduled Appointment: 06/06/2025 08:15 AM

## 2025-06-06 ENCOUNTER — APPOINTMENT (OUTPATIENT)
Dept: ELECTROPHYSIOLOGY | Facility: CLINIC | Age: 80
End: 2025-06-06
Payer: MEDICARE

## 2025-06-06 ENCOUNTER — NON-APPOINTMENT (OUTPATIENT)
Age: 80
End: 2025-06-06

## 2025-06-06 VITALS
BODY MASS INDEX: 30.76 KG/M2 | SYSTOLIC BLOOD PRESSURE: 169 MMHG | HEART RATE: 71 BPM | DIASTOLIC BLOOD PRESSURE: 85 MMHG | WEIGHT: 196 LBS | OXYGEN SATURATION: 96 % | HEIGHT: 67 IN

## 2025-06-06 VITALS — DIASTOLIC BLOOD PRESSURE: 77 MMHG | SYSTOLIC BLOOD PRESSURE: 149 MMHG

## 2025-06-06 PROBLEM — E78.5 HYPERLIPIDEMIA, UNSPECIFIED: Chronic | Status: ACTIVE | Noted: 2025-05-30

## 2025-06-06 PROBLEM — N40.0 BENIGN PROSTATIC HYPERPLASIA WITHOUT LOWER URINARY TRACT SYMPTOMS: Chronic | Status: ACTIVE | Noted: 2025-05-30

## 2025-06-06 PROBLEM — I72.3 ANEURYSM OF ILIAC ARTERY: Chronic | Status: ACTIVE | Noted: 2025-05-30

## 2025-06-06 PROBLEM — I10 ESSENTIAL (PRIMARY) HYPERTENSION: Chronic | Status: ACTIVE | Noted: 2025-05-30

## 2025-06-06 PROBLEM — I48.0 PAROXYSMAL ATRIAL FIBRILLATION: Chronic | Status: ACTIVE | Noted: 2025-05-30

## 2025-06-06 PROBLEM — G47.33 OBSTRUCTIVE SLEEP APNEA (ADULT) (PEDIATRIC): Chronic | Status: ACTIVE | Noted: 2025-05-30

## 2025-06-06 PROCEDURE — 99214 OFFICE O/P EST MOD 30 MIN: CPT

## 2025-06-06 PROCEDURE — 93000 ELECTROCARDIOGRAM COMPLETE: CPT

## 2025-06-06 RX ORDER — LISINOPRIL 10 MG/1
10 TABLET ORAL DAILY
Refills: 0 | Status: ACTIVE | COMMUNITY
Start: 2025-06-06

## 2025-06-10 ENCOUNTER — NON-APPOINTMENT (OUTPATIENT)
Age: 80
End: 2025-06-10

## 2025-08-01 ENCOUNTER — APPOINTMENT (OUTPATIENT)
Dept: ELECTROPHYSIOLOGY | Facility: CLINIC | Age: 80
End: 2025-08-01
Payer: MEDICARE

## 2025-08-01 VITALS
TEMPERATURE: 98.8 F | OXYGEN SATURATION: 97 % | WEIGHT: 196 LBS | HEART RATE: 60 BPM | HEIGHT: 67 IN | RESPIRATION RATE: 16 BRPM | BODY MASS INDEX: 30.76 KG/M2 | DIASTOLIC BLOOD PRESSURE: 76 MMHG | SYSTOLIC BLOOD PRESSURE: 166 MMHG

## 2025-08-01 DIAGNOSIS — I48.0 PAROXYSMAL ATRIAL FIBRILLATION: ICD-10-CM

## 2025-08-01 DIAGNOSIS — Z95.818 PRESENCE OF OTHER CARDIAC IMPLANTS AND GRAFTS: ICD-10-CM

## 2025-08-01 DIAGNOSIS — E78.00 PURE HYPERCHOLESTEROLEMIA, UNSPECIFIED: ICD-10-CM

## 2025-08-01 DIAGNOSIS — I10 ESSENTIAL (PRIMARY) HYPERTENSION: ICD-10-CM

## 2025-08-01 PROCEDURE — 93000 ELECTROCARDIOGRAM COMPLETE: CPT

## 2025-08-01 PROCEDURE — 99214 OFFICE O/P EST MOD 30 MIN: CPT

## 2025-08-05 ENCOUNTER — NON-APPOINTMENT (OUTPATIENT)
Age: 80
End: 2025-08-05

## 2025-08-06 ENCOUNTER — NON-APPOINTMENT (OUTPATIENT)
Age: 80
End: 2025-08-06

## 2025-08-06 RX ORDER — ASPIRIN 81 MG/1
81 TABLET, CHEWABLE ORAL
Refills: 0 | Status: ACTIVE | COMMUNITY

## 2025-08-20 ENCOUNTER — APPOINTMENT (OUTPATIENT)
Dept: ORTHOPEDIC SURGERY | Facility: CLINIC | Age: 80
End: 2025-08-20
Payer: MEDICARE

## 2025-08-20 VITALS
HEART RATE: 87 BPM | BODY MASS INDEX: 30.76 KG/M2 | SYSTOLIC BLOOD PRESSURE: 142 MMHG | DIASTOLIC BLOOD PRESSURE: 81 MMHG | HEIGHT: 67 IN | WEIGHT: 196 LBS

## 2025-08-20 DIAGNOSIS — M47.812 SPONDYLOSIS W/OUT MYELOPATHY OR RADICULOPATHY, CERVICAL REGION: ICD-10-CM

## 2025-08-20 DIAGNOSIS — M48.02 SPINAL STENOSIS, CERVICAL REGION: ICD-10-CM

## 2025-08-20 PROCEDURE — 99214 OFFICE O/P EST MOD 30 MIN: CPT

## (undated) DEVICE — DRAPE TOWEL BLUE STICKY

## (undated) DEVICE — SPECIMEN CONTAINER 100ML

## (undated) DEVICE — GOWN TRIMAX LG

## (undated) DEVICE — PACK MINOR

## (undated) DEVICE — DRSG CURITY GAUZE SPONGE 4 X 4" 12-PLY

## (undated) DEVICE — SUT MONOCRYL 4-0 27" PS-2 UNDYED

## (undated) DEVICE — DRAPE IOBAN 23" X 23"

## (undated) DEVICE — GLV 8 RADIATION

## (undated) DEVICE — NDL HYPO SAFE 18G X 1.5" (PINK)

## (undated) DEVICE — SUT VICRYL 3-0 18" X-1 (POP-OFF)

## (undated) DEVICE — STAPLER SKIN VISI-STAT 35 WIDE

## (undated) DEVICE — POSITIONER JACKSON TABLE HEADREST 7", CHEST, HIP, THIGH PADS, ARM CRADLE

## (undated) DEVICE — DRSG STERISTRIPS 0.5 X 4"

## (undated) DEVICE — DRAPE INSTRUMENT POUCH 6.75" X 11"

## (undated) DEVICE — VENODYNE/SCD SLEEVE CALF MEDIUM

## (undated) DEVICE — WARMING BLANKET LOWER ADULT

## (undated) DEVICE — SOL IRR POUR H2O 250ML

## (undated) DEVICE — DRAPE EQUIPMENT BANDED BAG 30 X 30" (SHOWER CAP)

## (undated) DEVICE — ELCTR BOVIE TIP BLADE INSULATED 2.75" EDGE

## (undated) DEVICE — BLADE SCALPEL SAFETYLOCK #20

## (undated) DEVICE — MEDICATION LABELS W MARKER

## (undated) DEVICE — SYR LOR GLASS 5ML

## (undated) DEVICE — SOL IRR POUR NS 0.9% 500ML

## (undated) DEVICE — DRAPE CHEST PAD COVER FOR SPINAL TABLE

## (undated) DEVICE — DRSG TEGADERM 3.5X6"

## (undated) DEVICE — MARKING PEN W RULER

## (undated) DEVICE — DRAPE C ARM UNIVERSAL

## (undated) DEVICE — DRAPE THYROID 77" X 123"

## (undated) DEVICE — DRAPE 1/2 SHEET 40X57"

## (undated) DEVICE — POSITIONER JACKSON TABLE PRONEVIEW CUSHION, CHEST, HIP, THIGH PADS

## (undated) DEVICE — DRAPE TOWEL BLUE 17" X 24"